# Patient Record
Sex: MALE | Race: WHITE | NOT HISPANIC OR LATINO | Employment: UNEMPLOYED
[De-identification: names, ages, dates, MRNs, and addresses within clinical notes are randomized per-mention and may not be internally consistent; named-entity substitution may affect disease eponyms.]

---

## 2019-04-02 ENCOUNTER — HOSPITAL (OUTPATIENT)
Dept: OTHER | Age: 54
End: 2019-04-02

## 2020-12-02 ENCOUNTER — APPOINTMENT (OUTPATIENT)
Dept: GENERAL RADIOLOGY | Age: 55
DRG: 282 | End: 2020-12-02
Attending: EMERGENCY MEDICINE

## 2020-12-02 ENCOUNTER — HOSPITAL ENCOUNTER (INPATIENT)
Age: 55
LOS: 5 days | Discharge: HOME | DRG: 282 | End: 2020-12-08
Attending: EMERGENCY MEDICINE | Admitting: INTERNAL MEDICINE

## 2020-12-02 ENCOUNTER — APPOINTMENT (OUTPATIENT)
Dept: CT IMAGING | Age: 55
DRG: 282 | End: 2020-12-02
Attending: EMERGENCY MEDICINE

## 2020-12-02 ENCOUNTER — APPOINTMENT (OUTPATIENT)
Dept: ULTRASOUND IMAGING | Age: 55
DRG: 282 | End: 2020-12-02
Attending: INTERNAL MEDICINE

## 2020-12-02 DIAGNOSIS — K85.20 ALCOHOL-INDUCED ACUTE PANCREATITIS WITHOUT INFECTION OR NECROSIS: ICD-10-CM

## 2020-12-02 DIAGNOSIS — E87.1 HYPONATREMIA: ICD-10-CM

## 2020-12-02 DIAGNOSIS — N30.01 ACUTE CYSTITIS WITH HEMATURIA: Primary | ICD-10-CM

## 2020-12-02 DIAGNOSIS — R79.89 ELEVATED LFTS: ICD-10-CM

## 2020-12-02 DIAGNOSIS — E87.6 HYPOKALEMIA: ICD-10-CM

## 2020-12-02 DIAGNOSIS — K85.90 ACUTE PANCREATITIS, UNSPECIFIED COMPLICATION STATUS, UNSPECIFIED PANCREATITIS TYPE: ICD-10-CM

## 2020-12-02 DIAGNOSIS — F10.10 ETOH ABUSE: ICD-10-CM

## 2020-12-02 LAB
ALBUMIN SERPL-MCNC: 3.1 G/DL (ref 3.6–5.1)
ALBUMIN/GLOB SERPL: 0.8 {RATIO} (ref 1–2.4)
ALP SERPL-CCNC: 146 UNITS/L (ref 45–117)
ALT SERPL-CCNC: 40 UNITS/L
ANION GAP SERPL CALC-SCNC: 13 MMOL/L (ref 10–20)
APPEARANCE UR: CLEAR
AST SERPL-CCNC: 59 UNITS/L
ATRIAL RATE (BPM): 93
BASOPHILS # BLD: 0 K/MCL (ref 0–0.3)
BASOPHILS NFR BLD: 0 %
BILIRUB SERPL-MCNC: 4.4 MG/DL (ref 0.2–1)
BILIRUB UR QL STRIP: ABNORMAL
BUN SERPL-MCNC: 9 MG/DL (ref 6–20)
BUN/CREAT SERPL: 20 (ref 7–25)
CALCIUM SERPL-MCNC: 8.5 MG/DL (ref 8.4–10.2)
CHLORIDE SERPL-SCNC: 92 MMOL/L (ref 98–107)
CO2 SERPL-SCNC: 28 MMOL/L (ref 21–32)
COLOR UR: ABNORMAL
CREAT SERPL-MCNC: 0.45 MG/DL (ref 0.67–1.17)
DIFFERENTIAL METHOD BLD: ABNORMAL
EOSINOPHIL # BLD: 0 K/MCL (ref 0.1–0.5)
EOSINOPHIL NFR BLD: 0 %
ERYTHROCYTE [DISTWIDTH] IN BLOOD: 12.6 % (ref 11–15)
GLOBULIN SER-MCNC: 3.8 G/DL (ref 2–4)
GLUCOSE SERPL-MCNC: 180 MG/DL (ref 65–99)
GLUCOSE UR STRIP-MCNC: 500 MG/DL
HCT VFR BLD CALC: 43.8 % (ref 39–51)
HGB BLD-MCNC: 15.3 G/DL (ref 13–17)
HGB UR QL STRIP: ABNORMAL
IMM GRANULOCYTES # BLD AUTO: 0.1 K/MCL (ref 0–0.2)
IMM GRANULOCYTES NFR BLD: 1 %
KETONES UR STRIP-MCNC: >160 MG/DL
LEUKOCYTE ESTERASE UR QL STRIP: NEGATIVE
LIPASE SERPL-CCNC: 212 UNITS/L (ref 73–393)
LYMPHOCYTES # BLD: 0.8 K/MCL (ref 1–4)
LYMPHOCYTES NFR BLD: 6 %
MCH RBC QN AUTO: 30.8 PG (ref 26–34)
MCHC RBC AUTO-ENTMCNC: 34.9 G/DL (ref 32–36.5)
MCV RBC AUTO: 88.1 FL (ref 78–100)
MONOCYTES # BLD: 0.6 K/MCL (ref 0.3–0.9)
MONOCYTES NFR BLD: 5 %
NEUTROPHILS # BLD: 11.2 K/MCL (ref 1.8–7.7)
NEUTROPHILS NFR BLD: 88 %
NITRITE UR QL STRIP: POSITIVE
NRBC BLD MANUAL-RTO: 0 /100 WBC
P AXIS (DEGREES): 59
PH UR STRIP: 6 UNITS (ref 5–7)
PLATELET # BLD: 82 K/MCL (ref 140–450)
POTASSIUM SERPL-SCNC: 2.4 MMOL/L (ref 3.4–5.1)
PR-INTERVAL (MSEC): 124
PROT SERPL-MCNC: 6.9 G/DL (ref 6.4–8.2)
PROT UR STRIP-MCNC: >300 MG/DL
QRS-INTERVAL (MSEC): 82
QT-INTERVAL (MSEC): 386
QTC: 480
R AXIS (DEGREES): 38
RBC # BLD: 4.97 MIL/MCL (ref 4.5–5.9)
REPORT TEXT: NORMAL
SARS-COV-2 RNA RESP QL NAA+PROBE: NOT DETECTED
SERVICE CMNT-IMP: NORMAL
SODIUM SERPL-SCNC: 131 MMOL/L (ref 135–145)
SP GR UR STRIP: 1.02 (ref 1–1.03)
SPECIMEN SOURCE: NORMAL
T AXIS (DEGREES): 44
TROPONIN I SERPL HS-MCNC: <0.02 NG/ML
UROBILINOGEN UR STRIP-MCNC: >8 MG/DL (ref 0–1)
VENTRICULAR RATE EKG/MIN (BPM): 93
WBC # BLD: 12.7 K/MCL (ref 4.2–11)

## 2020-12-02 PROCEDURE — G0378 HOSPITAL OBSERVATION PER HR: HCPCS

## 2020-12-02 PROCEDURE — 10002805 HB CONTRAST AGENT: Performed by: EMERGENCY MEDICINE

## 2020-12-02 PROCEDURE — 99220 INITIAL OBSERVATION CARE,LEVL III: CPT | Performed by: INTERNAL MEDICINE

## 2020-12-02 PROCEDURE — 76705 ECHO EXAM OF ABDOMEN: CPT

## 2020-12-02 PROCEDURE — 81003 URINALYSIS AUTO W/O SCOPE: CPT

## 2020-12-02 PROCEDURE — 74177 CT ABD & PELVIS W/CONTRAST: CPT

## 2020-12-02 PROCEDURE — 96365 THER/PROPH/DIAG IV INF INIT: CPT

## 2020-12-02 PROCEDURE — 93010 ELECTROCARDIOGRAM REPORT: CPT | Performed by: INTERNAL MEDICINE

## 2020-12-02 PROCEDURE — 71046 X-RAY EXAM CHEST 2 VIEWS: CPT

## 2020-12-02 PROCEDURE — 85025 COMPLETE CBC W/AUTO DIFF WBC: CPT

## 2020-12-02 PROCEDURE — 10002800 HB RX 250 W HCPCS: Performed by: EMERGENCY MEDICINE

## 2020-12-02 PROCEDURE — 10002801 HB RX 250 W/O HCPCS: Performed by: EMERGENCY MEDICINE

## 2020-12-02 PROCEDURE — 96376 TX/PRO/DX INJ SAME DRUG ADON: CPT

## 2020-12-02 PROCEDURE — 10002800 HB RX 250 W HCPCS: Performed by: INTERNAL MEDICINE

## 2020-12-02 PROCEDURE — U0003 INFECTIOUS AGENT DETECTION BY NUCLEIC ACID (DNA OR RNA); SEVERE ACUTE RESPIRATORY SYNDROME CORONAVIRUS 2 (SARS-COV-2) (CORONAVIRUS DISEASE [COVID-19]), AMPLIFIED PROBE TECHNIQUE, MAKING USE OF HIGH THROUGHPUT TECHNOLOGIES AS DESCRIBED BY CMS-2020-01-R: HCPCS

## 2020-12-02 PROCEDURE — 96366 THER/PROPH/DIAG IV INF ADDON: CPT

## 2020-12-02 PROCEDURE — 99285 EMERGENCY DEPT VISIT HI MDM: CPT | Performed by: EMERGENCY MEDICINE

## 2020-12-02 PROCEDURE — 96375 TX/PRO/DX INJ NEW DRUG ADDON: CPT

## 2020-12-02 PROCEDURE — 10002807 HB RX 258: Performed by: INTERNAL MEDICINE

## 2020-12-02 PROCEDURE — 83690 ASSAY OF LIPASE: CPT

## 2020-12-02 PROCEDURE — 10002807 HB RX 258: Performed by: EMERGENCY MEDICINE

## 2020-12-02 PROCEDURE — 10002801 HB RX 250 W/O HCPCS: Performed by: INTERNAL MEDICINE

## 2020-12-02 PROCEDURE — 84484 ASSAY OF TROPONIN QUANT: CPT

## 2020-12-02 PROCEDURE — 96361 HYDRATE IV INFUSION ADD-ON: CPT

## 2020-12-02 PROCEDURE — 93005 ELECTROCARDIOGRAM TRACING: CPT | Performed by: EMERGENCY MEDICINE

## 2020-12-02 PROCEDURE — 10002803 HB RX 637: Performed by: EMERGENCY MEDICINE

## 2020-12-02 PROCEDURE — 99285 EMERGENCY DEPT VISIT HI MDM: CPT

## 2020-12-02 PROCEDURE — 10004651 HB RX, NO CHARGE ITEM: Performed by: INTERNAL MEDICINE

## 2020-12-02 PROCEDURE — 80053 COMPREHEN METABOLIC PANEL: CPT

## 2020-12-02 RX ORDER — AMOXICILLIN 250 MG
2 CAPSULE ORAL DAILY PRN
Status: DISCONTINUED | OUTPATIENT
Start: 2020-12-02 | End: 2020-12-08 | Stop reason: HOSPADM

## 2020-12-02 RX ORDER — 0.9 % SODIUM CHLORIDE 0.9 %
2 VIAL (ML) INJECTION EVERY 12 HOURS SCHEDULED
Status: DISCONTINUED | OUTPATIENT
Start: 2020-12-02 | End: 2020-12-08 | Stop reason: HOSPADM

## 2020-12-02 RX ORDER — ACETAMINOPHEN 325 MG/1
650 TABLET ORAL EVERY 4 HOURS PRN
Status: DISCONTINUED | OUTPATIENT
Start: 2020-12-02 | End: 2020-12-08 | Stop reason: HOSPADM

## 2020-12-02 RX ORDER — POLYETHYLENE GLYCOL 3350 17 G/17G
17 POWDER, FOR SOLUTION ORAL DAILY PRN
Status: DISCONTINUED | OUTPATIENT
Start: 2020-12-02 | End: 2020-12-08 | Stop reason: HOSPADM

## 2020-12-02 RX ORDER — POTASSIUM CHLORIDE 14.9 MG/ML
20 INJECTION INTRAVENOUS ONCE
Status: COMPLETED | OUTPATIENT
Start: 2020-12-02 | End: 2020-12-02

## 2020-12-02 RX ORDER — FAMOTIDINE 10 MG/ML
20 INJECTION, SOLUTION INTRAVENOUS ONCE
Status: COMPLETED | OUTPATIENT
Start: 2020-12-02 | End: 2020-12-02

## 2020-12-02 RX ORDER — BISACODYL 10 MG
10 SUPPOSITORY, RECTAL RECTAL DAILY PRN
Status: DISCONTINUED | OUTPATIENT
Start: 2020-12-02 | End: 2020-12-08 | Stop reason: HOSPADM

## 2020-12-02 RX ORDER — ONDANSETRON 2 MG/ML
4 INJECTION INTRAMUSCULAR; INTRAVENOUS 2 TIMES DAILY PRN
Status: DISCONTINUED | OUTPATIENT
Start: 2020-12-02 | End: 2020-12-08 | Stop reason: HOSPADM

## 2020-12-02 RX ORDER — MAGNESIUM HYDROXIDE/ALUMINUM HYDROXICE/SIMETHICONE 120; 1200; 1200 MG/30ML; MG/30ML; MG/30ML
30 SUSPENSION ORAL EVERY 4 HOURS PRN
Status: DISCONTINUED | OUTPATIENT
Start: 2020-12-02 | End: 2020-12-08 | Stop reason: HOSPADM

## 2020-12-02 RX ORDER — POTASSIUM CHLORIDE 20 MEQ/1
40 TABLET, EXTENDED RELEASE ORAL ONCE
Status: COMPLETED | OUTPATIENT
Start: 2020-12-02 | End: 2020-12-02

## 2020-12-02 RX ORDER — CEFAZOLIN SODIUM/WATER 2 G/20 ML
2000 SYRINGE (ML) INTRAVENOUS DAILY
Status: DISCONTINUED | OUTPATIENT
Start: 2020-12-03 | End: 2020-12-06

## 2020-12-02 RX ORDER — ONDANSETRON 2 MG/ML
4 INJECTION INTRAMUSCULAR; INTRAVENOUS ONCE
Status: COMPLETED | OUTPATIENT
Start: 2020-12-02 | End: 2020-12-02

## 2020-12-02 RX ORDER — HEPARIN SODIUM 5000 [USP'U]/ML
5000 INJECTION, SOLUTION INTRAVENOUS; SUBCUTANEOUS EVERY 8 HOURS SCHEDULED
Status: DISCONTINUED | OUTPATIENT
Start: 2020-12-02 | End: 2020-12-08 | Stop reason: HOSPADM

## 2020-12-02 RX ORDER — CEFAZOLIN SODIUM/WATER 2 G/20 ML
2000 SYRINGE (ML) INTRAVENOUS ONCE
Status: COMPLETED | OUTPATIENT
Start: 2020-12-02 | End: 2020-12-02

## 2020-12-02 RX ORDER — HYDROCODONE BITARTRATE AND ACETAMINOPHEN 5; 325 MG/1; MG/1
1 TABLET ORAL EVERY 4 HOURS PRN
Status: DISCONTINUED | OUTPATIENT
Start: 2020-12-02 | End: 2020-12-08 | Stop reason: HOSPADM

## 2020-12-02 RX ORDER — FAMOTIDINE 10 MG/ML
20 INJECTION, SOLUTION INTRAVENOUS EVERY 12 HOURS SCHEDULED
Status: DISCONTINUED | OUTPATIENT
Start: 2020-12-02 | End: 2020-12-04

## 2020-12-02 RX ORDER — SODIUM CHLORIDE 9 MG/ML
INJECTION, SOLUTION INTRAVENOUS CONTINUOUS
Status: DISCONTINUED | OUTPATIENT
Start: 2020-12-02 | End: 2020-12-03

## 2020-12-02 RX ADMIN — MORPHINE SULFATE 2 MG: 2 INJECTION, SOLUTION INTRAMUSCULAR; INTRAVENOUS at 17:59

## 2020-12-02 RX ADMIN — CEFTRIAXONE SODIUM 2000 MG: 100 INJECTION, POWDER, FOR SOLUTION INTRAVENOUS at 14:36

## 2020-12-02 RX ADMIN — POTASSIUM CHLORIDE 40 MEQ: 1500 TABLET, EXTENDED RELEASE ORAL at 13:43

## 2020-12-02 RX ADMIN — SODIUM CHLORIDE, PRESERVATIVE FREE 2 ML: 5 INJECTION INTRAVENOUS at 20:56

## 2020-12-02 RX ADMIN — SODIUM CHLORIDE: 0.9 INJECTION, SOLUTION INTRAVENOUS at 18:02

## 2020-12-02 RX ADMIN — IOHEXOL 100 ML: 300 INJECTION, SOLUTION INTRAVENOUS at 14:08

## 2020-12-02 RX ADMIN — MORPHINE SULFATE 2 MG: 2 INJECTION, SOLUTION INTRAMUSCULAR; INTRAVENOUS at 21:49

## 2020-12-02 RX ADMIN — FAMOTIDINE 20 MG: 10 INJECTION INTRAVENOUS at 20:50

## 2020-12-02 RX ADMIN — SODIUM CHLORIDE 1000 ML: 0.9 INJECTION, SOLUTION INTRAVENOUS at 13:39

## 2020-12-02 RX ADMIN — ONDANSETRON 4 MG: 2 INJECTION INTRAMUSCULAR; INTRAVENOUS at 13:41

## 2020-12-02 RX ADMIN — MORPHINE SULFATE 4 MG: 4 INJECTION INTRAVENOUS at 13:40

## 2020-12-02 RX ADMIN — FAMOTIDINE 20 MG: 10 INJECTION INTRAVENOUS at 13:41

## 2020-12-02 RX ADMIN — POTASSIUM CHLORIDE 20 MEQ: 14.9 INJECTION, SOLUTION INTRAVENOUS at 14:38

## 2020-12-02 RX ADMIN — SODIUM CHLORIDE 1000 ML: 0.9 INJECTION, SOLUTION INTRAVENOUS at 14:37

## 2020-12-02 ASSESSMENT — ENCOUNTER SYMPTOMS
NAUSEA: 1
NUMBNESS: 0
ABDOMINAL PAIN: 1
WEAKNESS: 0
BACK PAIN: 0
POLYDIPSIA: 0
FEVER: 0
SPEECH DIFFICULTY: 0
SHORTNESS OF BREATH: 0
CONSTIPATION: 0
CHILLS: 0
DIZZINESS: 0
DIARRHEA: 0
VOMITING: 1
EYE REDNESS: 0
RHINORRHEA: 0
TREMORS: 0
BRUISES/BLEEDS EASILY: 0
EYE PAIN: 0
HEADACHES: 0
DIAPHORESIS: 0
COUGH: 0
FATIGUE: 0
SORE THROAT: 0

## 2020-12-02 ASSESSMENT — PAIN SCALES - GENERAL
PAINLEVEL_OUTOF10: 10
PAINLEVEL_OUTOF10: 3
PAINLEVEL_OUTOF10: 5
PAINLEVEL_OUTOF10: 0

## 2020-12-02 ASSESSMENT — PAIN DESCRIPTION - PAIN TYPE: TYPE: ACUTE PAIN

## 2020-12-03 PROBLEM — N30.01 ACUTE CYSTITIS WITH HEMATURIA: Status: ACTIVE | Noted: 2020-12-03

## 2020-12-03 PROBLEM — K85.90 ACUTE PANCREATITIS: Status: ACTIVE | Noted: 2020-12-03

## 2020-12-03 PROBLEM — D72.828 OTHER ELEVATED WHITE BLOOD CELL COUNT: Status: ACTIVE | Noted: 2020-12-03

## 2020-12-03 PROBLEM — E87.6 HYPOKALEMIA: Status: ACTIVE | Noted: 2020-12-03

## 2020-12-03 PROBLEM — E87.1 HYPONATREMIA: Status: ACTIVE | Noted: 2020-12-03

## 2020-12-03 LAB
ALBUMIN SERPL-MCNC: 2.7 G/DL (ref 3.6–5.1)
ALBUMIN/GLOB SERPL: 0.7 {RATIO} (ref 1–2.4)
ALP SERPL-CCNC: 125 UNITS/L (ref 45–117)
ALT SERPL-CCNC: 30 UNITS/L
ANION GAP SERPL CALC-SCNC: 17 MMOL/L (ref 10–20)
AST SERPL-CCNC: 39 UNITS/L
BASOPHILS # BLD: 0 K/MCL (ref 0–0.3)
BASOPHILS NFR BLD: 0 %
BILIRUB SERPL-MCNC: 2.9 MG/DL (ref 0.2–1)
BUN SERPL-MCNC: 6 MG/DL (ref 6–20)
BUN/CREAT SERPL: 14 (ref 7–25)
CALCIUM SERPL-MCNC: 8 MG/DL (ref 8.4–10.2)
CHLORIDE SERPL-SCNC: 93 MMOL/L (ref 98–107)
CO2 SERPL-SCNC: 25 MMOL/L (ref 21–32)
CREAT SERPL-MCNC: 0.43 MG/DL (ref 0.67–1.17)
DIFFERENTIAL METHOD BLD: ABNORMAL
EOSINOPHIL # BLD: 0 K/MCL (ref 0.1–0.5)
EOSINOPHIL NFR BLD: 0 %
ERYTHROCYTE [DISTWIDTH] IN BLOOD: 12.5 % (ref 11–15)
GLOBULIN SER-MCNC: 3.7 G/DL (ref 2–4)
GLUCOSE SERPL-MCNC: 117 MG/DL (ref 65–99)
HCT VFR BLD CALC: 41.7 % (ref 39–51)
HGB BLD-MCNC: 14.2 G/DL (ref 13–17)
IMM GRANULOCYTES # BLD AUTO: 0.1 K/MCL (ref 0–0.2)
IMM GRANULOCYTES NFR BLD: 1 %
LYMPHOCYTES # BLD: 0.9 K/MCL (ref 1–4)
LYMPHOCYTES NFR BLD: 9 %
MCH RBC QN AUTO: 31.3 PG (ref 26–34)
MCHC RBC AUTO-ENTMCNC: 34.1 G/DL (ref 32–36.5)
MCV RBC AUTO: 91.9 FL (ref 78–100)
MONOCYTES # BLD: 0.6 K/MCL (ref 0.3–0.9)
MONOCYTES NFR BLD: 6 %
NEUTROPHILS # BLD: 8.2 K/MCL (ref 1.8–7.7)
NEUTROPHILS NFR BLD: 84 %
NRBC BLD MANUAL-RTO: 0 /100 WBC
PLATELET # BLD: 81 K/MCL (ref 140–450)
POTASSIUM SERPL-SCNC: 2.7 MMOL/L (ref 3.4–5.1)
POTASSIUM SERPL-SCNC: 3 MMOL/L (ref 3.4–5.1)
PROT SERPL-MCNC: 6.4 G/DL (ref 6.4–8.2)
RBC # BLD: 4.54 MIL/MCL (ref 4.5–5.9)
SODIUM SERPL-SCNC: 132 MMOL/L (ref 135–145)
WBC # BLD: 9.7 K/MCL (ref 4.2–11)

## 2020-12-03 PROCEDURE — 10000002 HB ROOM CHARGE MED SURG

## 2020-12-03 PROCEDURE — G0378 HOSPITAL OBSERVATION PER HR: HCPCS

## 2020-12-03 PROCEDURE — 10004651 HB RX, NO CHARGE ITEM: Performed by: INTERNAL MEDICINE

## 2020-12-03 PROCEDURE — 90686 IIV4 VACC NO PRSV 0.5 ML IM: CPT | Performed by: INTERNAL MEDICINE

## 2020-12-03 PROCEDURE — 10002803 HB RX 637: Performed by: INTERNAL MEDICINE

## 2020-12-03 PROCEDURE — 10002807 HB RX 258: Performed by: NURSE PRACTITIONER

## 2020-12-03 PROCEDURE — 99254 IP/OBS CNSLTJ NEW/EST MOD 60: CPT | Performed by: NURSE PRACTITIONER

## 2020-12-03 PROCEDURE — 80053 COMPREHEN METABOLIC PANEL: CPT

## 2020-12-03 PROCEDURE — 10002800 HB RX 250 W HCPCS: Performed by: INTERNAL MEDICINE

## 2020-12-03 PROCEDURE — 96376 TX/PRO/DX INJ SAME DRUG ADON: CPT

## 2020-12-03 PROCEDURE — 99233 SBSQ HOSP IP/OBS HIGH 50: CPT | Performed by: INTERNAL MEDICINE

## 2020-12-03 PROCEDURE — 10002801 HB RX 250 W/O HCPCS: Performed by: INTERNAL MEDICINE

## 2020-12-03 PROCEDURE — 10002807 HB RX 258: Performed by: INTERNAL MEDICINE

## 2020-12-03 PROCEDURE — 36415 COLL VENOUS BLD VENIPUNCTURE: CPT

## 2020-12-03 PROCEDURE — 84132 ASSAY OF SERUM POTASSIUM: CPT

## 2020-12-03 PROCEDURE — 85025 COMPLETE CBC W/AUTO DIFF WBC: CPT

## 2020-12-03 RX ORDER — POTASSIUM CHLORIDE 20 MEQ/1
40 TABLET, EXTENDED RELEASE ORAL ONCE
Status: COMPLETED | OUTPATIENT
Start: 2020-12-03 | End: 2020-12-03

## 2020-12-03 RX ORDER — SODIUM CHLORIDE, SODIUM LACTATE, POTASSIUM CHLORIDE, CALCIUM CHLORIDE 600; 310; 30; 20 MG/100ML; MG/100ML; MG/100ML; MG/100ML
INJECTION, SOLUTION INTRAVENOUS CONTINUOUS
Status: DISCONTINUED | OUTPATIENT
Start: 2020-12-03 | End: 2020-12-08 | Stop reason: HOSPADM

## 2020-12-03 RX ORDER — ESCITALOPRAM OXALATE 20 MG/1
20 TABLET ORAL DAILY
COMMUNITY
Start: 2020-12-03

## 2020-12-03 RX ORDER — POTASSIUM CHLORIDE 20 MEQ/1
40 TABLET, EXTENDED RELEASE ORAL
Status: DISCONTINUED | OUTPATIENT
Start: 2020-12-03 | End: 2020-12-08 | Stop reason: HOSPADM

## 2020-12-03 RX ADMIN — POTASSIUM CHLORIDE 40 MEQ: 1500 TABLET, EXTENDED RELEASE ORAL at 13:53

## 2020-12-03 RX ADMIN — FAMOTIDINE 20 MG: 10 INJECTION INTRAVENOUS at 10:00

## 2020-12-03 RX ADMIN — HEPARIN SODIUM 5000 UNITS: 5000 INJECTION INTRAVENOUS; SUBCUTANEOUS at 13:41

## 2020-12-03 RX ADMIN — MORPHINE SULFATE 2 MG: 2 INJECTION, SOLUTION INTRAMUSCULAR; INTRAVENOUS at 14:31

## 2020-12-03 RX ADMIN — MORPHINE SULFATE 2 MG: 2 INJECTION, SOLUTION INTRAMUSCULAR; INTRAVENOUS at 18:39

## 2020-12-03 RX ADMIN — SODIUM CHLORIDE: 0.9 INJECTION, SOLUTION INTRAVENOUS at 06:01

## 2020-12-03 RX ADMIN — FAMOTIDINE 20 MG: 10 INJECTION INTRAVENOUS at 21:32

## 2020-12-03 RX ADMIN — HEPARIN SODIUM 5000 UNITS: 5000 INJECTION INTRAVENOUS; SUBCUTANEOUS at 21:33

## 2020-12-03 RX ADMIN — POTASSIUM CHLORIDE 40 MEQ: 1500 TABLET, EXTENDED RELEASE ORAL at 18:36

## 2020-12-03 RX ADMIN — CEFTRIAXONE SODIUM 2000 MG: 100 INJECTION, POWDER, FOR SOLUTION INTRAVENOUS at 10:27

## 2020-12-03 RX ADMIN — SODIUM CHLORIDE, PRESERVATIVE FREE 2 ML: 5 INJECTION INTRAVENOUS at 22:55

## 2020-12-03 RX ADMIN — POTASSIUM CHLORIDE 40 MEQ: 1500 TABLET, EXTENDED RELEASE ORAL at 10:26

## 2020-12-03 RX ADMIN — MORPHINE SULFATE 2 MG: 2 INJECTION, SOLUTION INTRAMUSCULAR; INTRAVENOUS at 02:23

## 2020-12-03 RX ADMIN — INFLUENZA A VIRUS A/GUANGDONG-MAONAN/SWL1536/2019 CNIC-1909 (H1N1) ANTIGEN (FORMALDEHYDE INACTIVATED), INFLUENZA A VIRUS A/HONG KONG/2671/2019 (H3N2) ANTIGEN (FORMALDEHYDE INACTIVATED), INFLUENZA B VIRUS B/PHUKET/3073/2013 ANTIGEN (FORMALDEHYDE INACTIVATED), AND INFLUENZA B VIRUS B/WASHINGTON/02/2019 ANTIGEN (FORMALDEHYDE INACTIVATED) 0.5 ML: 15; 15; 15; 15 INJECTION, SUSPENSION INTRAMUSCULAR at 13:42

## 2020-12-03 RX ADMIN — MORPHINE SULFATE 2 MG: 2 INJECTION, SOLUTION INTRAMUSCULAR; INTRAVENOUS at 05:59

## 2020-12-03 RX ADMIN — MORPHINE SULFATE 2 MG: 2 INJECTION, SOLUTION INTRAMUSCULAR; INTRAVENOUS at 10:26

## 2020-12-03 RX ADMIN — SODIUM CHLORIDE, SODIUM LACTATE, POTASSIUM CHLORIDE, AND CALCIUM CHLORIDE: .6; .31; .03; .02 INJECTION, SOLUTION INTRAVENOUS at 14:02

## 2020-12-03 RX ADMIN — SODIUM CHLORIDE, SODIUM LACTATE, POTASSIUM CHLORIDE, AND CALCIUM CHLORIDE: .6; .31; .03; .02 INJECTION, SOLUTION INTRAVENOUS at 18:44

## 2020-12-03 RX ADMIN — MORPHINE SULFATE 2 MG: 2 INJECTION, SOLUTION INTRAMUSCULAR; INTRAVENOUS at 22:43

## 2020-12-03 RX ADMIN — SODIUM CHLORIDE, PRESERVATIVE FREE 2 ML: 5 INJECTION INTRAVENOUS at 10:27

## 2020-12-03 ASSESSMENT — LIFESTYLE VARIABLES
AUDIT-C TOTAL SCORE: 1
HOW MANY STANDARD DRINKS CONTAINING ALCOHOL DO YOU HAVE ON A TYPICAL DAY: 0,1 OR 2
HOW OFTEN DO YOU HAVE A DRINK CONTAINING ALCOHOL: MONTHLY OR LESS
ALCOHOL_USE_STATUS: NO OR LOW RISK WITH VALIDATED TOOL
HOW OFTEN DO YOU HAVE 6 OR MORE DRINKS ON ONE OCCASION: NEVER

## 2020-12-03 ASSESSMENT — PAIN SCALES - GENERAL
PAINLEVEL_OUTOF10: 2
PAINLEVEL_OUTOF10: 4
PAINLEVEL_OUTOF10: 9
PAINLEVEL_OUTOF10: 8
PAINLEVEL_OUTOF10: 4

## 2020-12-03 ASSESSMENT — PAIN SCALES - WONG BAKER: WONGBAKER_NUMERICALRESPONSE: 4

## 2020-12-03 ASSESSMENT — COLUMBIA-SUICIDE SEVERITY RATING SCALE - C-SSRS
IS THE PATIENT ABLE TO COMPLETE C-SSRS: YES
2. HAVE YOU ACTUALLY HAD ANY THOUGHTS OF KILLING YOURSELF?: NO
6. HAVE YOU EVER DONE ANYTHING, STARTED TO DO ANYTHING, OR PREPARED TO DO ANYTHING TO END YOUR LIFE?: NO
1. WITHIN THE PAST MONTH, HAVE YOU WISHED YOU WERE DEAD OR WISHED YOU COULD GO TO SLEEP AND NOT WAKE UP?: NO

## 2020-12-03 ASSESSMENT — PATIENT HEALTH QUESTIONNAIRE - PHQ9
SUM OF ALL RESPONSES TO PHQ9 QUESTIONS 1 AND 2: 0
1. LITTLE INTEREST OR PLEASURE IN DOING THINGS: NOT AT ALL
SUM OF ALL RESPONSES TO PHQ9 QUESTIONS 1 AND 2: 0
IS PATIENT ABLE TO COMPLETE PHQ2 OR PHQ9: YES
CLINICAL INTERPRETATION OF PHQ9 SCORE: NO FURTHER SCREENING NEEDED
CLINICAL INTERPRETATION OF PHQ2 SCORE: NO FURTHER SCREENING NEEDED
2. FEELING DOWN, DEPRESSED OR HOPELESS: NOT AT ALL

## 2020-12-03 ASSESSMENT — COGNITIVE AND FUNCTIONAL STATUS - GENERAL
DO YOU HAVE SERIOUS DIFFICULTY WALKING OR CLIMBING STAIRS: NO
BECAUSE OF A PHYSICAL, MENTAL, OR EMOTIONAL CONDITION, DO YOU HAVE SERIOUS DIFFICULTY CONCENTRATING, REMEMBERING OR MAKING DECISIONS: NO
ARE YOU BLIND OR DO YOU HAVE SERIOUS DIFFICULTY SEEING, EVEN WHEN WEARING GLASSES: NO
DO YOU HAVE DIFFICULTY DRESSING OR BATHING: NO
BECAUSE OF A PHYSICAL, MENTAL, OR EMOTIONAL CONDITION, DO YOU HAVE DIFFICULTY DOING ERRANDS ALONE: NO
ARE YOU DEAF OR DO YOU HAVE SERIOUS DIFFICULTY  HEARING: NO

## 2020-12-03 ASSESSMENT — ACTIVITIES OF DAILY LIVING (ADL)
RECENT_DECLINE_ADL: NO
ADL_BEFORE_ADMISSION: INDEPENDENT
ADL_SHORT_OF_BREATH: NO
ADL_SCORE: 12

## 2020-12-04 ENCOUNTER — APPOINTMENT (OUTPATIENT)
Dept: GENERAL RADIOLOGY | Age: 55
DRG: 282 | End: 2020-12-04
Attending: INTERNAL MEDICINE

## 2020-12-04 LAB
ALBUMIN SERPL-MCNC: 2.7 G/DL (ref 3.6–5.1)
ALP SERPL-CCNC: 142 UNITS/L (ref 45–117)
ALT SERPL-CCNC: 30 UNITS/L
ANION GAP SERPL CALC-SCNC: 11 MMOL/L (ref 10–20)
AST SERPL-CCNC: 50 UNITS/L
BASOPHILS # BLD: 0 K/MCL (ref 0–0.3)
BASOPHILS NFR BLD: 1 %
BILIRUB CONJ SERPL-MCNC: 0.6 MG/DL (ref 0–0.2)
BILIRUB SERPL-MCNC: 1.3 MG/DL (ref 0.2–1)
BUN SERPL-MCNC: 4 MG/DL (ref 6–20)
BUN/CREAT SERPL: 11 (ref 7–25)
CALCIUM SERPL-MCNC: 8.4 MG/DL (ref 8.4–10.2)
CHLORIDE SERPL-SCNC: 96 MMOL/L (ref 98–107)
CO2 SERPL-SCNC: 31 MMOL/L (ref 21–32)
CREAT SERPL-MCNC: 0.38 MG/DL (ref 0.67–1.17)
DIFFERENTIAL METHOD BLD: ABNORMAL
EOSINOPHIL # BLD: 0.1 K/MCL (ref 0.1–0.5)
EOSINOPHIL NFR BLD: 2 %
ERYTHROCYTE [DISTWIDTH] IN BLOOD: 12.7 % (ref 11–15)
GLUCOSE SERPL-MCNC: 145 MG/DL (ref 65–99)
HCT VFR BLD CALC: 40.9 % (ref 39–51)
HGB BLD-MCNC: 14.1 G/DL (ref 13–17)
IMM GRANULOCYTES # BLD AUTO: 0 K/MCL (ref 0–0.2)
IMM GRANULOCYTES NFR BLD: 0 %
INR PPP: 1.3
INR PPP: 1.4
LYMPHOCYTES # BLD: 0.7 K/MCL (ref 1–4)
LYMPHOCYTES NFR BLD: 11 %
MAGNESIUM SERPL-MCNC: 1.6 MG/DL (ref 1.7–2.4)
MCH RBC QN AUTO: 31.3 PG (ref 26–34)
MCHC RBC AUTO-ENTMCNC: 34.5 G/DL (ref 32–36.5)
MCV RBC AUTO: 90.9 FL (ref 78–100)
MONOCYTES # BLD: 0.6 K/MCL (ref 0.3–0.9)
MONOCYTES NFR BLD: 11 %
NEUTROPHILS # BLD: 4.5 K/MCL (ref 1.8–7.7)
NEUTROPHILS NFR BLD: 75 %
NRBC BLD MANUAL-RTO: 0 /100 WBC
PLATELET # BLD: 93 K/MCL (ref 140–450)
POTASSIUM SERPL-SCNC: 2.7 MMOL/L (ref 3.4–5.1)
POTASSIUM SERPL-SCNC: 2.8 MMOL/L (ref 3.4–5.1)
POTASSIUM SERPL-SCNC: 2.9 MMOL/L (ref 3.4–5.1)
PROT SERPL-MCNC: 6.8 G/DL (ref 6.4–8.2)
PROTHROMBIN TIME: 14.4 SEC (ref 9.7–11.8)
PROTHROMBIN TIME: 14.6 SEC (ref 9.7–11.8)
RBC # BLD: 4.5 MIL/MCL (ref 4.5–5.9)
SODIUM SERPL-SCNC: 135 MMOL/L (ref 135–145)
WBC # BLD: 6 K/MCL (ref 4.2–11)

## 2020-12-04 PROCEDURE — 71046 X-RAY EXAM CHEST 2 VIEWS: CPT

## 2020-12-04 PROCEDURE — 85610 PROTHROMBIN TIME: CPT

## 2020-12-04 PROCEDURE — 10002801 HB RX 250 W/O HCPCS: Performed by: INTERNAL MEDICINE

## 2020-12-04 PROCEDURE — 80048 BASIC METABOLIC PNL TOTAL CA: CPT

## 2020-12-04 PROCEDURE — 84132 ASSAY OF SERUM POTASSIUM: CPT

## 2020-12-04 PROCEDURE — 10002803 HB RX 637: Performed by: INTERNAL MEDICINE

## 2020-12-04 PROCEDURE — 99233 SBSQ HOSP IP/OBS HIGH 50: CPT | Performed by: INTERNAL MEDICINE

## 2020-12-04 PROCEDURE — 10000002 HB ROOM CHARGE MED SURG

## 2020-12-04 PROCEDURE — 10002807 HB RX 258: Performed by: NURSE PRACTITIONER

## 2020-12-04 PROCEDURE — 10004651 HB RX, NO CHARGE ITEM: Performed by: INTERNAL MEDICINE

## 2020-12-04 PROCEDURE — 99254 IP/OBS CNSLTJ NEW/EST MOD 60: CPT | Performed by: NURSE PRACTITIONER

## 2020-12-04 PROCEDURE — 85025 COMPLETE CBC W/AUTO DIFF WBC: CPT

## 2020-12-04 PROCEDURE — X1094 NO CHARGE VISIT: HCPCS | Performed by: NURSE PRACTITIONER

## 2020-12-04 PROCEDURE — 80076 HEPATIC FUNCTION PANEL: CPT

## 2020-12-04 PROCEDURE — 36415 COLL VENOUS BLD VENIPUNCTURE: CPT

## 2020-12-04 PROCEDURE — 83735 ASSAY OF MAGNESIUM: CPT

## 2020-12-04 PROCEDURE — 10002800 HB RX 250 W HCPCS: Performed by: INTERNAL MEDICINE

## 2020-12-04 RX ORDER — POTASSIUM CHLORIDE 20 MEQ/1
40 TABLET, EXTENDED RELEASE ORAL ONCE
Status: COMPLETED | OUTPATIENT
Start: 2020-12-04 | End: 2020-12-04

## 2020-12-04 RX ORDER — ESCITALOPRAM OXALATE 10 MG/1
20 TABLET ORAL NIGHTLY
Status: CANCELLED | OUTPATIENT
Start: 2020-12-04

## 2020-12-04 RX ORDER — FAMOTIDINE 20 MG/1
20 TABLET, FILM COATED ORAL EVERY 12 HOURS SCHEDULED
Status: DISCONTINUED | OUTPATIENT
Start: 2020-12-04 | End: 2020-12-08 | Stop reason: HOSPADM

## 2020-12-04 RX ORDER — ESCITALOPRAM OXALATE 20 MG/1
20 TABLET ORAL NIGHTLY
Status: DISCONTINUED | OUTPATIENT
Start: 2020-12-04 | End: 2020-12-08 | Stop reason: HOSPADM

## 2020-12-04 RX ORDER — LANOLIN ALCOHOL/MO/W.PET/CERES
400 CREAM (GRAM) TOPICAL EVERY 8 HOURS
Status: COMPLETED | OUTPATIENT
Start: 2020-12-04 | End: 2020-12-04

## 2020-12-04 RX ADMIN — SODIUM CHLORIDE, PRESERVATIVE FREE 2 ML: 5 INJECTION INTRAVENOUS at 22:12

## 2020-12-04 RX ADMIN — SODIUM CHLORIDE, PRESERVATIVE FREE 2 ML: 5 INJECTION INTRAVENOUS at 07:55

## 2020-12-04 RX ADMIN — SODIUM CHLORIDE, SODIUM LACTATE, POTASSIUM CHLORIDE, AND CALCIUM CHLORIDE: .6; .31; .03; .02 INJECTION, SOLUTION INTRAVENOUS at 12:16

## 2020-12-04 RX ADMIN — MORPHINE SULFATE 2 MG: 2 INJECTION, SOLUTION INTRAMUSCULAR; INTRAVENOUS at 07:53

## 2020-12-04 RX ADMIN — HYDROCODONE BITARTRATE AND ACETAMINOPHEN 1 TABLET: 5; 325 TABLET ORAL at 02:48

## 2020-12-04 RX ADMIN — Medication 400 MG: at 11:26

## 2020-12-04 RX ADMIN — HEPARIN SODIUM 5000 UNITS: 5000 INJECTION INTRAVENOUS; SUBCUTANEOUS at 13:42

## 2020-12-04 RX ADMIN — Medication 400 MG: at 20:32

## 2020-12-04 RX ADMIN — POTASSIUM CHLORIDE 40 MEQ: 1500 TABLET, EXTENDED RELEASE ORAL at 22:06

## 2020-12-04 RX ADMIN — FAMOTIDINE 20 MG: 20 TABLET ORAL at 20:33

## 2020-12-04 RX ADMIN — POTASSIUM CHLORIDE 40 MEQ: 1500 TABLET, EXTENDED RELEASE ORAL at 02:48

## 2020-12-04 RX ADMIN — CEFTRIAXONE SODIUM 2000 MG: 100 INJECTION, POWDER, FOR SOLUTION INTRAVENOUS at 08:00

## 2020-12-04 RX ADMIN — POTASSIUM CHLORIDE 40 MEQ: 1500 TABLET, EXTENDED RELEASE ORAL at 07:58

## 2020-12-04 RX ADMIN — ESCITALOPRAM OXALATE 20 MG: 20 TABLET ORAL at 22:06

## 2020-12-04 RX ADMIN — SODIUM CHLORIDE, SODIUM LACTATE, POTASSIUM CHLORIDE, AND CALCIUM CHLORIDE: .6; .31; .03; .02 INJECTION, SOLUTION INTRAVENOUS at 04:33

## 2020-12-04 RX ADMIN — HYDROCODONE BITARTRATE AND ACETAMINOPHEN 1 TABLET: 5; 325 TABLET ORAL at 20:33

## 2020-12-04 RX ADMIN — HYDROCODONE BITARTRATE AND ACETAMINOPHEN 1 TABLET: 5; 325 TABLET ORAL at 11:26

## 2020-12-04 RX ADMIN — HYDROCODONE BITARTRATE AND ACETAMINOPHEN 1 TABLET: 5; 325 TABLET ORAL at 15:59

## 2020-12-04 RX ADMIN — FAMOTIDINE 20 MG: 10 INJECTION INTRAVENOUS at 07:55

## 2020-12-04 RX ADMIN — ONDANSETRON 4 MG: 2 INJECTION INTRAMUSCULAR; INTRAVENOUS at 13:39

## 2020-12-04 RX ADMIN — POTASSIUM CHLORIDE 40 MEQ: 1500 TABLET, EXTENDED RELEASE ORAL at 11:26

## 2020-12-04 RX ADMIN — HEPARIN SODIUM 5000 UNITS: 5000 INJECTION INTRAVENOUS; SUBCUTANEOUS at 20:33

## 2020-12-04 ASSESSMENT — PAIN SCALES - GENERAL
PAINLEVEL_OUTOF10: 4
PAINLEVEL_OUTOF10: 3
PAINLEVEL_OUTOF10: 6
PAINLEVEL_OUTOF10: 7
PAINLEVEL_OUTOF10: 9

## 2020-12-05 LAB
ALBUMIN SERPL-MCNC: 2.7 G/DL (ref 3.6–5.1)
ALBUMIN/GLOB SERPL: 0.8 {RATIO} (ref 1–2.4)
ALP SERPL-CCNC: 156 UNITS/L (ref 45–117)
ALT SERPL-CCNC: 27 UNITS/L
ANION GAP SERPL CALC-SCNC: 10 MMOL/L (ref 10–20)
ANION GAP SERPL CALC-SCNC: 9 MMOL/L (ref 10–20)
AST SERPL-CCNC: 49 UNITS/L
BILIRUB SERPL-MCNC: 1 MG/DL (ref 0.2–1)
BUN SERPL-MCNC: 3 MG/DL (ref 6–20)
BUN SERPL-MCNC: 4 MG/DL (ref 6–20)
BUN/CREAT SERPL: 11 (ref 7–25)
BUN/CREAT SERPL: 8 (ref 7–25)
CALCIUM SERPL-MCNC: 8.2 MG/DL (ref 8.4–10.2)
CALCIUM SERPL-MCNC: 8.3 MG/DL (ref 8.4–10.2)
CHLORIDE SERPL-SCNC: 96 MMOL/L (ref 98–107)
CHLORIDE SERPL-SCNC: 97 MMOL/L (ref 98–107)
CO2 SERPL-SCNC: 33 MMOL/L (ref 21–32)
CO2 SERPL-SCNC: 34 MMOL/L (ref 21–32)
CREAT SERPL-MCNC: 0.38 MG/DL (ref 0.67–1.17)
CREAT SERPL-MCNC: 0.38 MG/DL (ref 0.67–1.17)
DEPRECATED RDW RBC: 41.9 FL (ref 39–50)
ERYTHROCYTE [DISTWIDTH] IN BLOOD: 12.7 % (ref 11–15)
FASTING DURATION TIME PATIENT: ABNORMAL H
FASTING DURATION TIME PATIENT: ABNORMAL H
GFR SERPLBLD BASED ON 1.73 SQ M-ARVRAT: >90 ML/MIN/1.73M2
GFR SERPLBLD BASED ON 1.73 SQ M-ARVRAT: >90 ML/MIN/1.73M2
GLOBULIN SER-MCNC: 3.3 G/DL (ref 2–4)
GLUCOSE SERPL-MCNC: 141 MG/DL (ref 65–99)
GLUCOSE SERPL-MCNC: 150 MG/DL (ref 65–99)
HCT VFR BLD CALC: 40.7 % (ref 39–51)
HGB BLD-MCNC: 13.8 G/DL (ref 13–17)
MAGNESIUM SERPL-MCNC: 1.4 MG/DL (ref 1.7–2.4)
MCH RBC QN AUTO: 31 PG (ref 26–34)
MCHC RBC AUTO-ENTMCNC: 33.9 G/DL (ref 32–36.5)
MCV RBC AUTO: 91.5 FL (ref 78–100)
NRBC BLD MANUAL-RTO: 0 /100 WBC
PLATELET # BLD AUTO: 115 K/MCL (ref 140–450)
POTASSIUM SERPL-SCNC: 3.2 MMOL/L (ref 3.4–5.1)
POTASSIUM SERPL-SCNC: 3.7 MMOL/L (ref 3.4–5.1)
PROT SERPL-MCNC: 6 G/DL (ref 6.4–8.2)
RBC # BLD: 4.45 MIL/MCL (ref 4.5–5.9)
SODIUM SERPL-SCNC: 134 MMOL/L (ref 135–145)
SODIUM SERPL-SCNC: 138 MMOL/L (ref 135–145)
WBC # BLD: 4.8 K/MCL (ref 4.2–11)

## 2020-12-05 PROCEDURE — 99233 SBSQ HOSP IP/OBS HIGH 50: CPT | Performed by: INTERNAL MEDICINE

## 2020-12-05 PROCEDURE — 10004651 HB RX, NO CHARGE ITEM: Performed by: INTERNAL MEDICINE

## 2020-12-05 PROCEDURE — 10002803 HB RX 637: Performed by: INTERNAL MEDICINE

## 2020-12-05 PROCEDURE — 80048 BASIC METABOLIC PNL TOTAL CA: CPT | Performed by: INTERNAL MEDICINE

## 2020-12-05 PROCEDURE — 36415 COLL VENOUS BLD VENIPUNCTURE: CPT | Performed by: INTERNAL MEDICINE

## 2020-12-05 PROCEDURE — 80053 COMPREHEN METABOLIC PANEL: CPT | Performed by: INTERNAL MEDICINE

## 2020-12-05 PROCEDURE — 80074 ACUTE HEPATITIS PANEL: CPT | Performed by: INTERNAL MEDICINE

## 2020-12-05 PROCEDURE — 10002807 HB RX 258: Performed by: NURSE PRACTITIONER

## 2020-12-05 PROCEDURE — X1094 NO CHARGE VISIT: HCPCS | Performed by: INTERNAL MEDICINE

## 2020-12-05 PROCEDURE — 83735 ASSAY OF MAGNESIUM: CPT | Performed by: INTERNAL MEDICINE

## 2020-12-05 PROCEDURE — 85027 COMPLETE CBC AUTOMATED: CPT | Performed by: INTERNAL MEDICINE

## 2020-12-05 PROCEDURE — 10002800 HB RX 250 W HCPCS: Performed by: INTERNAL MEDICINE

## 2020-12-05 PROCEDURE — 10000002 HB ROOM CHARGE MED SURG

## 2020-12-05 PROCEDURE — 99232 SBSQ HOSP IP/OBS MODERATE 35: CPT | Performed by: INTERNAL MEDICINE

## 2020-12-05 RX ORDER — POTASSIUM CHLORIDE 20 MEQ/1
40 TABLET, EXTENDED RELEASE ORAL ONCE
Status: COMPLETED | OUTPATIENT
Start: 2020-12-05 | End: 2020-12-05

## 2020-12-05 RX ORDER — POLYETHYLENE GLYCOL 3350 17 G/17G
17 POWDER, FOR SOLUTION ORAL DAILY
Status: DISCONTINUED | OUTPATIENT
Start: 2020-12-05 | End: 2020-12-06

## 2020-12-05 RX ORDER — LANOLIN ALCOHOL/MO/W.PET/CERES
400 CREAM (GRAM) TOPICAL EVERY 8 HOURS
Status: COMPLETED | OUTPATIENT
Start: 2020-12-05 | End: 2020-12-06

## 2020-12-05 RX ADMIN — HYDROCODONE BITARTRATE AND ACETAMINOPHEN 1 TABLET: 5; 325 TABLET ORAL at 14:04

## 2020-12-05 RX ADMIN — ESCITALOPRAM OXALATE 20 MG: 20 TABLET ORAL at 22:13

## 2020-12-05 RX ADMIN — SODIUM CHLORIDE, SODIUM LACTATE, POTASSIUM CHLORIDE, AND CALCIUM CHLORIDE: .6; .31; .03; .02 INJECTION, SOLUTION INTRAVENOUS at 14:07

## 2020-12-05 RX ADMIN — HEPARIN SODIUM 5000 UNITS: 5000 INJECTION INTRAVENOUS; SUBCUTANEOUS at 22:15

## 2020-12-05 RX ADMIN — HEPARIN SODIUM 5000 UNITS: 5000 INJECTION INTRAVENOUS; SUBCUTANEOUS at 15:24

## 2020-12-05 RX ADMIN — HEPARIN SODIUM 5000 UNITS: 5000 INJECTION INTRAVENOUS; SUBCUTANEOUS at 06:20

## 2020-12-05 RX ADMIN — HYDROCODONE BITARTRATE AND ACETAMINOPHEN 1 TABLET: 5; 325 TABLET ORAL at 06:19

## 2020-12-05 RX ADMIN — Medication 400 MG: at 18:04

## 2020-12-05 RX ADMIN — FAMOTIDINE 20 MG: 20 TABLET ORAL at 22:12

## 2020-12-05 RX ADMIN — POTASSIUM CHLORIDE 40 MEQ: 1500 TABLET, EXTENDED RELEASE ORAL at 08:42

## 2020-12-05 RX ADMIN — SODIUM CHLORIDE, PRESERVATIVE FREE 2 ML: 5 INJECTION INTRAVENOUS at 08:46

## 2020-12-05 RX ADMIN — ONDANSETRON 4 MG: 2 INJECTION INTRAMUSCULAR; INTRAVENOUS at 08:57

## 2020-12-05 RX ADMIN — SODIUM CHLORIDE, PRESERVATIVE FREE 2 ML: 5 INJECTION INTRAVENOUS at 22:15

## 2020-12-05 RX ADMIN — SODIUM CHLORIDE, SODIUM LACTATE, POTASSIUM CHLORIDE, AND CALCIUM CHLORIDE: .6; .31; .03; .02 INJECTION, SOLUTION INTRAVENOUS at 20:03

## 2020-12-05 RX ADMIN — SODIUM CHLORIDE, SODIUM LACTATE, POTASSIUM CHLORIDE, AND CALCIUM CHLORIDE: .6; .31; .03; .02 INJECTION, SOLUTION INTRAVENOUS at 08:46

## 2020-12-05 RX ADMIN — POLYETHYLENE GLYCOL (3350) 17 G: 17 POWDER, FOR SOLUTION ORAL at 15:25

## 2020-12-05 RX ADMIN — POTASSIUM CHLORIDE 40 MEQ: 1500 TABLET, EXTENDED RELEASE ORAL at 18:05

## 2020-12-05 RX ADMIN — CEFTRIAXONE SODIUM 2000 MG: 100 INJECTION, POWDER, FOR SOLUTION INTRAVENOUS at 08:43

## 2020-12-05 RX ADMIN — HYDROCODONE BITARTRATE AND ACETAMINOPHEN 1 TABLET: 5; 325 TABLET ORAL at 00:47

## 2020-12-05 RX ADMIN — HYDROCODONE BITARTRATE AND ACETAMINOPHEN 1 TABLET: 5; 325 TABLET ORAL at 22:12

## 2020-12-05 RX ADMIN — HYDROCODONE BITARTRATE AND ACETAMINOPHEN 1 TABLET: 5; 325 TABLET ORAL at 09:56

## 2020-12-05 RX ADMIN — FAMOTIDINE 20 MG: 20 TABLET ORAL at 08:43

## 2020-12-05 RX ADMIN — HYDROCODONE BITARTRATE AND ACETAMINOPHEN 1 TABLET: 5; 325 TABLET ORAL at 18:05

## 2020-12-05 ASSESSMENT — PAIN SCALES - GENERAL
PAINLEVEL_OUTOF10: 9
PAINLEVEL_OUTOF10: 10
PAINLEVEL_OUTOF10: 6
PAINLEVEL_OUTOF10: 10
PAINLEVEL_OUTOF10: 9
PAINLEVEL_OUTOF10: 8
PAINLEVEL_OUTOF10: 7
PAINLEVEL_OUTOF10: 8

## 2020-12-06 LAB
ANION GAP SERPL CALC-SCNC: 11 MMOL/L (ref 10–20)
ANNOTATION COMMENT IMP: NORMAL
BUN SERPL-MCNC: 2 MG/DL (ref 6–20)
BUN/CREAT SERPL: 5 (ref 7–25)
CALCIUM SERPL-MCNC: 8.3 MG/DL (ref 8.4–10.2)
CHLORIDE SERPL-SCNC: 100 MMOL/L (ref 98–107)
CO2 SERPL-SCNC: 29 MMOL/L (ref 21–32)
CREAT SERPL-MCNC: 0.44 MG/DL (ref 0.67–1.17)
FASTING DURATION TIME PATIENT: ABNORMAL H
GFR SERPLBLD BASED ON 1.73 SQ M-ARVRAT: >90 ML/MIN/1.73M2
GLUCOSE SERPL-MCNC: 123 MG/DL (ref 65–99)
HAV IGM SER QL: NEGATIVE
HBV CORE IGM SER QL: NEGATIVE
HBV SURFACE AG SER QL: NEGATIVE
HCV AB SER QL: NEGATIVE
IMP & REVIEW OF LAB RESULTS: NORMAL
MAGNESIUM SERPL-MCNC: 1.4 MG/DL (ref 1.7–2.4)
MAGNESIUM SERPL-MCNC: 1.5 MG/DL (ref 1.7–2.4)
POTASSIUM SERPL-SCNC: 3.6 MMOL/L (ref 3.4–5.1)
SODIUM SERPL-SCNC: 136 MMOL/L (ref 135–145)

## 2020-12-06 PROCEDURE — 10004651 HB RX, NO CHARGE ITEM: Performed by: INTERNAL MEDICINE

## 2020-12-06 PROCEDURE — 10000002 HB ROOM CHARGE MED SURG

## 2020-12-06 PROCEDURE — 99232 SBSQ HOSP IP/OBS MODERATE 35: CPT | Performed by: INTERNAL MEDICINE

## 2020-12-06 PROCEDURE — 83735 ASSAY OF MAGNESIUM: CPT | Performed by: INTERNAL MEDICINE

## 2020-12-06 PROCEDURE — 99233 SBSQ HOSP IP/OBS HIGH 50: CPT | Performed by: INTERNAL MEDICINE

## 2020-12-06 PROCEDURE — 10002800 HB RX 250 W HCPCS: Performed by: INTERNAL MEDICINE

## 2020-12-06 PROCEDURE — 10002807 HB RX 258: Performed by: NURSE PRACTITIONER

## 2020-12-06 PROCEDURE — 10002803 HB RX 637: Performed by: INTERNAL MEDICINE

## 2020-12-06 PROCEDURE — 80048 BASIC METABOLIC PNL TOTAL CA: CPT | Performed by: INTERNAL MEDICINE

## 2020-12-06 PROCEDURE — 36415 COLL VENOUS BLD VENIPUNCTURE: CPT | Performed by: INTERNAL MEDICINE

## 2020-12-06 RX ORDER — MAGNESIUM SULFATE 4 G/50ML
4 INJECTION INTRAVENOUS ONCE
Status: COMPLETED | OUTPATIENT
Start: 2020-12-06 | End: 2020-12-06

## 2020-12-06 RX ORDER — POLYETHYLENE GLYCOL 3350 17 G/17G
17 POWDER, FOR SOLUTION ORAL 2 TIMES DAILY
Status: DISCONTINUED | OUTPATIENT
Start: 2020-12-06 | End: 2020-12-08 | Stop reason: HOSPADM

## 2020-12-06 RX ADMIN — POLYETHYLENE GLYCOL (3350) 17 G: 17 POWDER, FOR SOLUTION ORAL at 21:31

## 2020-12-06 RX ADMIN — SODIUM CHLORIDE, PRESERVATIVE FREE 2 ML: 5 INJECTION INTRAVENOUS at 09:17

## 2020-12-06 RX ADMIN — ESCITALOPRAM OXALATE 20 MG: 20 TABLET ORAL at 21:29

## 2020-12-06 RX ADMIN — POLYETHYLENE GLYCOL (3350) 17 G: 17 POWDER, FOR SOLUTION ORAL at 09:18

## 2020-12-06 RX ADMIN — HYDROCODONE BITARTRATE AND ACETAMINOPHEN 1 TABLET: 5; 325 TABLET ORAL at 10:05

## 2020-12-06 RX ADMIN — FAMOTIDINE 20 MG: 20 TABLET ORAL at 09:18

## 2020-12-06 RX ADMIN — POTASSIUM CHLORIDE 40 MEQ: 1500 TABLET, EXTENDED RELEASE ORAL at 09:18

## 2020-12-06 RX ADMIN — HEPARIN SODIUM 5000 UNITS: 5000 INJECTION INTRAVENOUS; SUBCUTANEOUS at 05:57

## 2020-12-06 RX ADMIN — HYDROCODONE BITARTRATE AND ACETAMINOPHEN 1 TABLET: 5; 325 TABLET ORAL at 21:58

## 2020-12-06 RX ADMIN — SODIUM CHLORIDE, PRESERVATIVE FREE 2 ML: 5 INJECTION INTRAVENOUS at 21:59

## 2020-12-06 RX ADMIN — HEPARIN SODIUM 5000 UNITS: 5000 INJECTION INTRAVENOUS; SUBCUTANEOUS at 21:30

## 2020-12-06 RX ADMIN — CEFTRIAXONE SODIUM 2000 MG: 100 INJECTION, POWDER, FOR SOLUTION INTRAVENOUS at 09:17

## 2020-12-06 RX ADMIN — SODIUM CHLORIDE, SODIUM LACTATE, POTASSIUM CHLORIDE, AND CALCIUM CHLORIDE: .6; .31; .03; .02 INJECTION, SOLUTION INTRAVENOUS at 11:45

## 2020-12-06 RX ADMIN — HYDROCODONE BITARTRATE AND ACETAMINOPHEN 1 TABLET: 5; 325 TABLET ORAL at 01:59

## 2020-12-06 RX ADMIN — HEPARIN SODIUM 5000 UNITS: 5000 INJECTION INTRAVENOUS; SUBCUTANEOUS at 14:10

## 2020-12-06 RX ADMIN — HYDROCODONE BITARTRATE AND ACETAMINOPHEN 1 TABLET: 5; 325 TABLET ORAL at 18:12

## 2020-12-06 RX ADMIN — Medication 400 MG: at 00:35

## 2020-12-06 RX ADMIN — MAGNESIUM SULFATE IN WATER 4 G: 80 INJECTION, SOLUTION INTRAVENOUS at 10:09

## 2020-12-06 RX ADMIN — HYDROCODONE BITARTRATE AND ACETAMINOPHEN 1 TABLET: 5; 325 TABLET ORAL at 05:57

## 2020-12-06 RX ADMIN — HYDROCODONE BITARTRATE AND ACETAMINOPHEN 1 TABLET: 5; 325 TABLET ORAL at 14:10

## 2020-12-06 RX ADMIN — SODIUM CHLORIDE, SODIUM LACTATE, POTASSIUM CHLORIDE, AND CALCIUM CHLORIDE: .6; .31; .03; .02 INJECTION, SOLUTION INTRAVENOUS at 01:31

## 2020-12-06 RX ADMIN — SODIUM CHLORIDE, SODIUM LACTATE, POTASSIUM CHLORIDE, AND CALCIUM CHLORIDE: .6; .31; .03; .02 INJECTION, SOLUTION INTRAVENOUS at 18:13

## 2020-12-06 RX ADMIN — FAMOTIDINE 20 MG: 20 TABLET ORAL at 21:30

## 2020-12-06 ASSESSMENT — PAIN SCALES - GENERAL
PAINLEVEL_OUTOF10: 8
PAINLEVEL_OUTOF10: 9
PAINLEVEL_OUTOF10: 9
PAINLEVEL_OUTOF10: 8
PAINLEVEL_OUTOF10: 8

## 2020-12-07 LAB
ANION GAP SERPL CALC-SCNC: 12 MMOL/L (ref 10–20)
BASOPHILS # BLD: 0 K/MCL (ref 0–0.3)
BASOPHILS NFR BLD: 1 %
BUN SERPL-MCNC: 3 MG/DL (ref 6–20)
BUN/CREAT SERPL: 6 (ref 7–25)
CALCIUM SERPL-MCNC: 9 MG/DL (ref 8.4–10.2)
CHLORIDE SERPL-SCNC: 98 MMOL/L (ref 98–107)
CO2 SERPL-SCNC: 30 MMOL/L (ref 21–32)
CREAT SERPL-MCNC: 0.47 MG/DL (ref 0.67–1.17)
DEPRECATED RDW RBC: 43.4 FL (ref 39–50)
EOSINOPHIL # BLD: 0.2 K/MCL (ref 0–0.5)
EOSINOPHIL NFR BLD: 4 %
ERYTHROCYTE [DISTWIDTH] IN BLOOD: 12.5 % (ref 11–15)
FASTING DURATION TIME PATIENT: ABNORMAL H
GFR SERPLBLD BASED ON 1.73 SQ M-ARVRAT: >90 ML/MIN/1.73M2
GLUCOSE SERPL-MCNC: 132 MG/DL (ref 65–99)
HCT VFR BLD CALC: 40 % (ref 39–51)
HGB BLD-MCNC: 13.4 G/DL (ref 13–17)
IMM GRANULOCYTES # BLD AUTO: 0.1 K/MCL (ref 0–0.2)
IMM GRANULOCYTES # BLD: 1 %
LYMPHOCYTES # BLD: 0.8 K/MCL (ref 1–4)
LYMPHOCYTES NFR BLD: 16 %
MAGNESIUM SERPL-MCNC: 2 MG/DL (ref 1.7–2.4)
MCH RBC QN AUTO: 31.1 PG (ref 26–34)
MCHC RBC AUTO-ENTMCNC: 33.5 G/DL (ref 32–36.5)
MCV RBC AUTO: 92.8 FL (ref 78–100)
MONOCYTES # BLD: 0.9 K/MCL (ref 0.3–0.9)
MONOCYTES NFR BLD: 17 %
NEUTROPHILS # BLD: 3 K/MCL (ref 1.8–7.7)
NEUTROPHILS NFR BLD: 61 %
NRBC BLD MANUAL-RTO: 0 /100 WBC
PLATELET # BLD AUTO: 179 K/MCL (ref 140–450)
POTASSIUM SERPL-SCNC: 4.4 MMOL/L (ref 3.4–5.1)
RBC # BLD: 4.31 MIL/MCL (ref 4.5–5.9)
SODIUM SERPL-SCNC: 136 MMOL/L (ref 135–145)
WBC # BLD: 4.9 K/MCL (ref 4.2–11)

## 2020-12-07 PROCEDURE — 83735 ASSAY OF MAGNESIUM: CPT | Performed by: INTERNAL MEDICINE

## 2020-12-07 PROCEDURE — 10002800 HB RX 250 W HCPCS: Performed by: INTERNAL MEDICINE

## 2020-12-07 PROCEDURE — 80048 BASIC METABOLIC PNL TOTAL CA: CPT | Performed by: INTERNAL MEDICINE

## 2020-12-07 PROCEDURE — X1094 NO CHARGE VISIT: HCPCS | Performed by: NURSE PRACTITIONER

## 2020-12-07 PROCEDURE — 10002803 HB RX 637: Performed by: INTERNAL MEDICINE

## 2020-12-07 PROCEDURE — 85025 COMPLETE CBC W/AUTO DIFF WBC: CPT | Performed by: INTERNAL MEDICINE

## 2020-12-07 PROCEDURE — 10002807 HB RX 258: Performed by: NURSE PRACTITIONER

## 2020-12-07 PROCEDURE — 36415 COLL VENOUS BLD VENIPUNCTURE: CPT | Performed by: INTERNAL MEDICINE

## 2020-12-07 PROCEDURE — 99233 SBSQ HOSP IP/OBS HIGH 50: CPT | Performed by: FAMILY MEDICINE

## 2020-12-07 PROCEDURE — 10004651 HB RX, NO CHARGE ITEM: Performed by: INTERNAL MEDICINE

## 2020-12-07 PROCEDURE — 10000002 HB ROOM CHARGE MED SURG

## 2020-12-07 PROCEDURE — 99232 SBSQ HOSP IP/OBS MODERATE 35: CPT | Performed by: NURSE PRACTITIONER

## 2020-12-07 RX ADMIN — SODIUM CHLORIDE, PRESERVATIVE FREE 2 ML: 5 INJECTION INTRAVENOUS at 21:08

## 2020-12-07 RX ADMIN — POTASSIUM CHLORIDE 40 MEQ: 1500 TABLET, EXTENDED RELEASE ORAL at 08:25

## 2020-12-07 RX ADMIN — HYDROCODONE BITARTRATE AND ACETAMINOPHEN 1 TABLET: 5; 325 TABLET ORAL at 03:24

## 2020-12-07 RX ADMIN — FAMOTIDINE 20 MG: 20 TABLET ORAL at 08:25

## 2020-12-07 RX ADMIN — HYDROCODONE BITARTRATE AND ACETAMINOPHEN 1 TABLET: 5; 325 TABLET ORAL at 12:40

## 2020-12-07 RX ADMIN — MAGNESIUM HYDROXIDE 30 ML: 400 SUSPENSION ORAL at 14:22

## 2020-12-07 RX ADMIN — HYDROCODONE BITARTRATE AND ACETAMINOPHEN 1 TABLET: 5; 325 TABLET ORAL at 21:01

## 2020-12-07 RX ADMIN — HEPARIN SODIUM 5000 UNITS: 5000 INJECTION INTRAVENOUS; SUBCUTANEOUS at 21:09

## 2020-12-07 RX ADMIN — SODIUM CHLORIDE, SODIUM LACTATE, POTASSIUM CHLORIDE, AND CALCIUM CHLORIDE: .6; .31; .03; .02 INJECTION, SOLUTION INTRAVENOUS at 03:26

## 2020-12-07 RX ADMIN — HYDROCODONE BITARTRATE AND ACETAMINOPHEN 1 TABLET: 5; 325 TABLET ORAL at 08:25

## 2020-12-07 RX ADMIN — POLYETHYLENE GLYCOL (3350) 17 G: 17 POWDER, FOR SOLUTION ORAL at 08:27

## 2020-12-07 RX ADMIN — FAMOTIDINE 20 MG: 20 TABLET ORAL at 21:02

## 2020-12-07 RX ADMIN — HEPARIN SODIUM 5000 UNITS: 5000 INJECTION INTRAVENOUS; SUBCUTANEOUS at 14:24

## 2020-12-07 RX ADMIN — POLYETHYLENE GLYCOL (3350) 17 G: 17 POWDER, FOR SOLUTION ORAL at 21:04

## 2020-12-07 RX ADMIN — HEPARIN SODIUM 5000 UNITS: 5000 INJECTION INTRAVENOUS; SUBCUTANEOUS at 06:32

## 2020-12-07 RX ADMIN — HYDROCODONE BITARTRATE AND ACETAMINOPHEN 1 TABLET: 5; 325 TABLET ORAL at 16:14

## 2020-12-07 RX ADMIN — ESCITALOPRAM OXALATE 20 MG: 20 TABLET ORAL at 21:00

## 2020-12-07 ASSESSMENT — PAIN SCALES - PAIN ASSESSMENT IN ADVANCED DEMENTIA (PAINAD)
CONSOLABILITY: NO NEED TO CONSOLE
BREATHING: NORMAL

## 2020-12-07 ASSESSMENT — PAIN SCALES - GENERAL
PAINLEVEL_OUTOF10: 9
PAINLEVEL_OUTOF10: 5
PAINLEVEL_OUTOF10: 8
PAINLEVEL_OUTOF10: 9
PAINLEVEL_OUTOF10: 9

## 2020-12-08 VITALS
OXYGEN SATURATION: 97 % | HEART RATE: 66 BPM | BODY MASS INDEX: 23.59 KG/M2 | RESPIRATION RATE: 16 BRPM | SYSTOLIC BLOOD PRESSURE: 113 MMHG | DIASTOLIC BLOOD PRESSURE: 81 MMHG | WEIGHT: 174.16 LBS | HEIGHT: 72 IN | TEMPERATURE: 98.4 F

## 2020-12-08 PROCEDURE — 10004651 HB RX, NO CHARGE ITEM: Performed by: INTERNAL MEDICINE

## 2020-12-08 PROCEDURE — 99239 HOSP IP/OBS DSCHRG MGMT >30: CPT | Performed by: FAMILY MEDICINE

## 2020-12-08 PROCEDURE — 10002803 HB RX 637: Performed by: INTERNAL MEDICINE

## 2020-12-08 PROCEDURE — 10002807 HB RX 258: Performed by: NURSE PRACTITIONER

## 2020-12-08 RX ORDER — HYDROCODONE BITARTRATE AND ACETAMINOPHEN 5; 325 MG/1; MG/1
1 TABLET ORAL EVERY 6 HOURS PRN
Qty: 12 TABLET | Refills: 0 | Status: SHIPPED | OUTPATIENT
Start: 2020-12-08

## 2020-12-08 RX ORDER — HYDROCODONE BITARTRATE AND ACETAMINOPHEN 5; 325 MG/1; MG/1
1 TABLET ORAL EVERY 6 HOURS PRN
Qty: 12 TABLET | Refills: 0 | Status: SHIPPED | OUTPATIENT
Start: 2020-12-08 | End: 2020-12-08

## 2020-12-08 RX ADMIN — POLYETHYLENE GLYCOL (3350) 17 G: 17 POWDER, FOR SOLUTION ORAL at 09:01

## 2020-12-08 RX ADMIN — HYDROCODONE BITARTRATE AND ACETAMINOPHEN 1 TABLET: 5; 325 TABLET ORAL at 13:00

## 2020-12-08 RX ADMIN — FAMOTIDINE 20 MG: 20 TABLET ORAL at 09:01

## 2020-12-08 RX ADMIN — POTASSIUM CHLORIDE 40 MEQ: 1500 TABLET, EXTENDED RELEASE ORAL at 09:01

## 2020-12-08 RX ADMIN — HYDROCODONE BITARTRATE AND ACETAMINOPHEN 1 TABLET: 5; 325 TABLET ORAL at 04:17

## 2020-12-08 RX ADMIN — SODIUM CHLORIDE, SODIUM LACTATE, POTASSIUM CHLORIDE, AND CALCIUM CHLORIDE: .6; .31; .03; .02 INJECTION, SOLUTION INTRAVENOUS at 00:40

## 2020-12-08 RX ADMIN — SODIUM CHLORIDE, PRESERVATIVE FREE 2 ML: 5 INJECTION INTRAVENOUS at 09:01

## 2020-12-08 RX ADMIN — MAGNESIUM HYDROXIDE 30 ML: 400 SUSPENSION ORAL at 04:27

## 2020-12-08 RX ADMIN — HYDROCODONE BITARTRATE AND ACETAMINOPHEN 1 TABLET: 5; 325 TABLET ORAL at 09:01

## 2020-12-08 ASSESSMENT — PAIN SCALES - GENERAL
PAINLEVEL_OUTOF10: 8
PAINLEVEL_OUTOF10: 5

## 2024-02-23 RX ORDER — GABAPENTIN 300 MG/1
300 CAPSULE ORAL 3 TIMES DAILY
COMMUNITY

## 2024-02-23 RX ORDER — ESCITALOPRAM OXALATE 10 MG/1
10 TABLET ORAL DAILY
COMMUNITY

## 2024-02-23 RX ORDER — PANTOPRAZOLE SODIUM 40 MG/1
40 TABLET, DELAYED RELEASE ORAL
COMMUNITY

## 2024-02-23 RX ORDER — METFORMIN HYDROCHLORIDE EXTENDED-RELEASE TABLETS 500 MG/1
500 TABLET, FILM COATED, EXTENDED RELEASE ORAL
COMMUNITY

## 2024-02-23 RX ORDER — GLYBURIDE 2.5 MG/1
2 TABLET ORAL
COMMUNITY

## 2024-03-12 ENCOUNTER — HOSPITAL ENCOUNTER (OUTPATIENT)
Facility: HOSPITAL | Age: 59
Setting detail: HOSPITAL OUTPATIENT SURGERY
Discharge: HOME | End: 2024-03-12
Attending: INTERNAL MEDICINE | Admitting: INTERNAL MEDICINE
Payer: COMMERCIAL

## 2024-03-12 ENCOUNTER — ANESTHESIA (OUTPATIENT)
Dept: ENDOSCOPY | Facility: HOSPITAL | Age: 59
End: 2024-03-12
Payer: COMMERCIAL

## 2024-03-12 ENCOUNTER — ANESTHESIA EVENT (OUTPATIENT)
Dept: ENDOSCOPY | Facility: HOSPITAL | Age: 59
End: 2024-03-12
Payer: COMMERCIAL

## 2024-03-12 VITALS
BODY MASS INDEX: 21.26 KG/M2 | HEART RATE: 73 BPM | SYSTOLIC BLOOD PRESSURE: 114 MMHG | RESPIRATION RATE: 16 BRPM | WEIGHT: 157 LBS | OXYGEN SATURATION: 98 % | TEMPERATURE: 98 F | HEIGHT: 72 IN | DIASTOLIC BLOOD PRESSURE: 76 MMHG

## 2024-03-12 LAB — GLUCOSE BLD-MCNC: 113 MG/DL (ref 70–99)

## 2024-03-12 PROCEDURE — 0FDG8ZX EXTRACTION OF PANCREAS, VIA NATURAL OR ARTIFICIAL OPENING ENDOSCOPIC, DIAGNOSTIC: ICD-10-PCS | Performed by: INTERNAL MEDICINE

## 2024-03-12 PROCEDURE — 0D758ZZ DILATION OF ESOPHAGUS, VIA NATURAL OR ARTIFICIAL OPENING ENDOSCOPIC: ICD-10-PCS | Performed by: INTERNAL MEDICINE

## 2024-03-12 PROCEDURE — 0DB58ZX EXCISION OF ESOPHAGUS, VIA NATURAL OR ARTIFICIAL OPENING ENDOSCOPIC, DIAGNOSTIC: ICD-10-PCS | Performed by: INTERNAL MEDICINE

## 2024-03-12 PROCEDURE — 88305 TISSUE EXAM BY PATHOLOGIST: CPT | Performed by: INTERNAL MEDICINE

## 2024-03-12 PROCEDURE — 82962 GLUCOSE BLOOD TEST: CPT

## 2024-03-12 PROCEDURE — BF47ZZZ ULTRASONOGRAPHY OF PANCREAS: ICD-10-PCS | Performed by: INTERNAL MEDICINE

## 2024-03-12 PROCEDURE — 88172 CYTP DX EVAL FNA 1ST EA SITE: CPT | Performed by: INTERNAL MEDICINE

## 2024-03-12 PROCEDURE — 88173 CYTOPATH EVAL FNA REPORT: CPT | Performed by: INTERNAL MEDICINE

## 2024-03-12 RX ORDER — DEXTROSE MONOHYDRATE 25 G/50ML
50 INJECTION, SOLUTION INTRAVENOUS
Status: DISCONTINUED | OUTPATIENT
Start: 2024-03-12 | End: 2024-03-12

## 2024-03-12 RX ORDER — NICOTINE POLACRILEX 4 MG
15 LOZENGE BUCCAL
Status: DISCONTINUED | OUTPATIENT
Start: 2024-03-12 | End: 2024-03-12

## 2024-03-12 RX ORDER — SODIUM CHLORIDE, SODIUM LACTATE, POTASSIUM CHLORIDE, CALCIUM CHLORIDE 600; 310; 30; 20 MG/100ML; MG/100ML; MG/100ML; MG/100ML
INJECTION, SOLUTION INTRAVENOUS CONTINUOUS
Status: DISCONTINUED | OUTPATIENT
Start: 2024-03-12 | End: 2024-03-12

## 2024-03-12 RX ORDER — NICOTINE POLACRILEX 4 MG
30 LOZENGE BUCCAL
Status: DISCONTINUED | OUTPATIENT
Start: 2024-03-12 | End: 2024-03-12

## 2024-03-12 RX ORDER — LIDOCAINE HYDROCHLORIDE 10 MG/ML
INJECTION, SOLUTION EPIDURAL; INFILTRATION; INTRACAUDAL; PERINEURAL AS NEEDED
Status: DISCONTINUED | OUTPATIENT
Start: 2024-03-12 | End: 2024-03-12 | Stop reason: SURG

## 2024-03-12 RX ORDER — NALOXONE HYDROCHLORIDE 0.4 MG/ML
0.08 INJECTION, SOLUTION INTRAMUSCULAR; INTRAVENOUS; SUBCUTANEOUS ONCE AS NEEDED
Status: DISCONTINUED | OUTPATIENT
Start: 2024-03-12 | End: 2024-03-12

## 2024-03-12 RX ADMIN — SODIUM CHLORIDE, SODIUM LACTATE, POTASSIUM CHLORIDE, CALCIUM CHLORIDE: 600; 310; 30; 20 INJECTION, SOLUTION INTRAVENOUS at 16:31:00

## 2024-03-12 RX ADMIN — LIDOCAINE HYDROCHLORIDE 25 MG: 10 INJECTION, SOLUTION EPIDURAL; INFILTRATION; INTRACAUDAL; PERINEURAL at 15:48:00

## 2024-03-12 RX ADMIN — SODIUM CHLORIDE, SODIUM LACTATE, POTASSIUM CHLORIDE, CALCIUM CHLORIDE: 600; 310; 30; 20 INJECTION, SOLUTION INTRAVENOUS at 15:45:00

## 2024-03-12 NOTE — H&P
History & Physical Examination    Patient Name: Cirilo Reyes  MRN: [id number]  CSN: [id number]  YOB: 1965    Diagnosis: esophageal dysphagia, lesion of pancreas , weight loss, unitentional      Present Illness:  Cirilo Reyes is a 59 year old male is here esophageal dysphagia, lesion of pancreas , weight loss, unitentional.    Body mass index is 21.29 kg/m².    Past Medical History:   Diagnosis Date    Diabetes (HCC)     Esophageal reflux     Hearing impairment     [employer] [place]    Neuropathy     Problems with swallowing     Visual impairment        Procedure: EGD EUS    Physician Pre-Sedation Assessment    Pre-Sedation Assessment:    Sedation History: Airway Assessed    ASA Classification: 2. Patient with mild systemic disease    Cardiac:    Respiratory:    Abdomen:      Plan: MAC        Current Facility-Administered Medications   Medication Dose Route Frequency    glucose (Dex4) 15 GM/59ML oral liquid 15 g  15 g Oral Q15 Min PRN    Or    glucose (Glutose) 40% oral gel 15 g  15 g Oral Q15 Min PRN    Or    glucose-vitamin C (Dex-4) chewable tab 4 tablet  4 tablet Oral Q15 Min PRN    Or    dextrose 50% injection 50 mL  50 mL Intravenous Q15 Min PRN    Or    glucose (Dex4) 15 GM/59ML oral liquid 30 g  30 g Oral Q15 Min PRN    Or    glucose (Glutose) 40% oral gel 30 g  30 g Oral Q15 Min PRN    Or    glucose-vitamin C (Dex-4) chewable tab 8 tablet  8 tablet Oral Q15 Min PRN    lactated ringers infusion   Intravenous Continuous       Allergies: No Known Allergies    Past Surgical History:   Procedure Laterality Date    CARPAL TUNNEL RELEASE Right     COLONOSCOPY      FRACTURE SURGERY Right     wrist    OTHER ACCESSORY Left     leg    OTHER SURGICAL HISTORY      jaw    OTHER SURGICAL HISTORY Left     elbow    OTHER SURGICAL HISTORY Left     leg    UPPER GI ENDOSCOPY,EXAM       History reviewed. No pertinent family history.  Social History     Tobacco Use    Smoking status: Every Day     Packs/day: 1     Types:  Cigarettes    Smokeless tobacco: Never   Substance Use Topics    Alcohol use: Yes     Alcohol/week: 2.0 standard drinks of alcohol     Types: 2 Cans of beer per week       SYSTEM Check if Review is Normal Check if Physical Exam is Normal If not normal, please explain:   HEENT [x ] [x ]    NECK & BACK [x ] [x ]    HEART [x ] [x ]    LUNGS [x ] [x ]    ABDOMEN [x ] [x ]    UROGENITAL [ ] [ ]    EXTREMITIES [ ] [ ]    OTHER        [ x ] I have discussed the risks and benefits and alternatives with the patient/family.  They understand and agree to proceed with plan of care.  [ x ] I have reviewed the History and Physical done within the last 30 days.  Any changes noted above.    Harry Grossman MD  3/12/2024  3:36 PM

## 2024-03-12 NOTE — ANESTHESIA PREPROCEDURE EVALUATION
PRE-OP EVALUATION    Patient Name: Cirilo Reyes    Admit Diagnosis: esophageal dysphagia, lesion of pancreas , weight loss, unitentional    Pre-op Diagnosis: esophageal dysphagia, lesion of pancreas , weight loss, unitentional    ESOPHAGOGASTRODUODENOSCOPY and   UPPER ENDOSCOPIC ULTRASOUND WITH FINE NEEDLE ASPIRATION    Anesthesia Procedure: ESOPHAGOGASTRODUODENOSCOPY and  UPPER ENDOSCOPIC ULTRASOUND WITH FINE NEEDLE ASPIRATION  ENDOSCOPIC ULTRASOUND (EUS)    Surgeon(s) and Role:     * Harry Grossman MD - Primary    Pre-op vitals reviewed.  Temp: 97.8 °F (36.6 °C)  Pulse: 78  Resp: 18  BP: 111/75  SpO2: 100 %  Body mass index is 21.29 kg/m².    Current medications reviewed.  Hospital Medications:   glucose (Dex4) 15 GM/59ML oral liquid 15 g  15 g Oral Q15 Min PRN    Or    glucose (Glutose) 40% oral gel 15 g  15 g Oral Q15 Min PRN    Or    glucose-vitamin C (Dex-4) chewable tab 4 tablet  4 tablet Oral Q15 Min PRN    Or    dextrose 50% injection 50 mL  50 mL Intravenous Q15 Min PRN    Or    glucose (Dex4) 15 GM/59ML oral liquid 30 g  30 g Oral Q15 Min PRN    Or    glucose (Glutose) 40% oral gel 30 g  30 g Oral Q15 Min PRN    Or    glucose-vitamin C (Dex-4) chewable tab 8 tablet  8 tablet Oral Q15 Min PRN    lactated ringers infusion   Intravenous Continuous       Outpatient Medications:     Medications Prior to Admission   Medication Sig Dispense Refill Last Dose    metFORMIN HCl ER, OSM, 500 MG (OSM) Oral Tablet 24 Hr Take 1 tablet (500 mg total) by mouth daily with breakfast.   3/11/2024    glyBURIDE 2.5 MG Oral Tab Take 2 mg by mouth daily with breakfast.   3/12/2024    escitalopram 10 MG Oral Tab Take 1 tablet (10 mg total) by mouth daily.   3/12/2024    pantoprazole 40 MG Oral Tab EC Take 1 tablet (40 mg total) by mouth every morning before breakfast.   3/12/2024    gabapentin 300 MG Oral Cap Take 1 capsule (300 mg total) by mouth 3 (three) times daily.   3/11/2024       Allergies: Patient has no known  allergies.      Anesthesia Evaluation    Patient summary reviewed.    Anesthetic Complications  (-) history of anesthetic complications         GI/Hepatic/Renal      (+) GERD                           Cardiovascular    Negative cardiovascular ROS.    Exercise tolerance: good     MET: >4                                           Endo/Other      (+) diabetes and well controlled, type 2, not using insulin                         Pulmonary    Negative pulmonary ROS.                       Neuro/Psych    Negative neuro/psych ROS.                                  Past Surgical History:   Procedure Laterality Date    CARPAL TUNNEL RELEASE Right     COLONOSCOPY      FRACTURE SURGERY Right     wrist    OTHER ACCESSORY Left     leg    OTHER SURGICAL HISTORY      jaw    OTHER SURGICAL HISTORY Left     elbow    OTHER SURGICAL HISTORY Left     leg    UPPER GI ENDOSCOPY,EXAM       Social History     Socioeconomic History    Marital status:    Tobacco Use    Smoking status: Every Day     Packs/day: 1     Types: Cigarettes    Smokeless tobacco: Never   Vaping Use    Vaping Use: Never used   Substance and Sexual Activity    Alcohol use: Yes     Alcohol/week: 2.0 standard drinks of alcohol     Types: 2 Cans of beer per week    Drug use: Never     History   Drug Use Unknown     Available pre-op labs reviewed.               Airway      Mallampati: III  Mouth opening: 3 FB  TM distance: > 6 cm  Neck ROM: full Cardiovascular    Cardiovascular exam normal.  Rhythm: regular    (-) murmur   Dental      Dental appliance(s): upper dentures and lower dentures       Pulmonary    Pulmonary exam normal.  Breath sounds clear to auscultation bilaterally.               Other findings              ASA: 2   Plan: MAC  NPO status verified and patient meets guidelines.        Comment: MAC discussed.  All questions answered.  Patient expressed understanding and agrees to proceed.    Plan/risks discussed with: patient                Present on  Admission:  **None**

## 2024-03-12 NOTE — ANESTHESIA POSTPROCEDURE EVALUATION
[facility]    Cirilo [facility] Patient Status:  Hospital Outpatient Surgery   Age/Gender 59 year old male MRN [id number]   Location [facility] ENDOSCOPY [facility] Attending Harry Grossman MD   Hosp Day # 0 PCP Wil Wahl MD       Anesthesia Post-op Note    ESOPHAGOGASTRODUODENOSCOPY with balloon dilation  and biopsies  UPPER ENDOSCOPIC ULTRASOUND WITH FINE NEEDLE ASPIRATION    Procedure Summary       Date: 03/12/24 Room / Location:  ENDOSCOPY 01 / [facility]    Anesthesia Start: 1545 Anesthesia Stop: 1631    Procedures:       ESOPHAGOGASTRODUODENOSCOPY with balloon dilation  and biopsies UPPER ENDOSCOPIC ULTRASOUND WITH FINE NEEDLE ASPIRATION      ENDOSCOPIC ULTRASOUND (EUS) Diagnosis: (hiatal hernia, barretts, esophageal ring  eus: pancreas tail lesion)    Surgeons: Harry Grossman MD Anesthesiologist: Pool Sosa MD    Anesthesia Type: MAC ASA Status: 2            Anesthesia Type: MAC    Vitals Value Taken Time   /67 03/12/24 1638   Temp  03/12/24 1639   Pulse 76 03/12/24 1639   Resp 16 03/12/24 1627   SpO2 99 % 03/12/24 1639   Vitals shown include unfiled device data.    Patient Location: Endoscopy    Anesthesia Type: MAC    Airway Patency: patent    Postop Pain Control: adequate    Mental Status: preanesthetic baseline    Nausea/Vomiting: none    Cardiopulmonary/Hydration status: stable euvolemic    Complications: no apparent anesthesia related complications    Postop vital signs: stable    Dental Exam: Unchanged from Preop    Patient to be discharged home when criteria met.

## 2024-03-12 NOTE — OPERATIVE REPORT
[facility] OPERATIVE REPORT   PATIENT NAME: Cirilo Reyes  MRN: [id number]  DATE OF OPERATION: 3/12/2024  PREOPERATIVE DIAGNOSIS: Solid food dysphagia, weight loss, abnormal pancreas with atrophy  POSTOPERATIVE DIAGNOSIS:    1.  EGD    -Long segment 9 cm circumferential Perera's esophagus without raised lesion [id number]    -Moderate 3 cm hiatal hernia    -Multiple rings in the proximal esophagus with dilation starting at 17 mm-dilation done to max of 19 mm with linear tears noted   2.  EUS    -Fatty appearing pancreatic neck and body; relative atrophy in body of pancreas    -Hypoechoic pancreatic tail with hyperechoic foci's and nodularity likely from chronic pancreatitis    PROCEDURE PERFORMED: upper endoscopy/ ENDOSCOPIC ultrasound with FNA  SEDATION MEDICATIONS: MAC  PREOPERATIVE MEDICATIONS:   PREPROCEDURE ASSESSMENT: The indication for this procedure is to assess for pancreatic mass, Perera's esophagus, esophageal stricture. The patient was identified by myself and nursing staff in the exam room. Informed consent was obtained. The patient was seen in clinic and a full H&P was obtained. On brief physical examination, airway is patent. Chest is clear. Heart has regular rate and rhythm. Abdomen is soft, nontender with good bowel sounds. A medication list was taken by nursing today and reviewed by myself. The patient is an ASA grade 2.   PROCEDURE NOTE: The procedure was completed without difficulty. The patient tolerated the procedure well.   Upper endoscopy (EGD):  The endoscope was inserted through the mouth and advanced to the level of the duodenum, 3rd portion.  Visualized portion of the esophagus, stomach including antrum, body, fundus and cardiac, and duodenum were normal except for several findings.  A long segment Perera's esophagus was noted from 26 to 35 cm from incisors.  Multiple biopsies in the esophagus were taken.  Proximal esophageal biopsies were also taken.  There was a 3 cm hiatal hernia from  35 to 38 cm (Hill type II). A 15 to 18 mm CRE balloon was inflated at the GE junction and no stricture was seen.  However as the 15 mm balloon was pulled into the proximal esophagus there were multiple rings noted.  No obvious dilation was seen at 15 mm but at 16.5 mm balloon insufflation, there were small linear tears.  Balloon was very slowly and carefully inflated serially to 17.5 mm and then to 18 mm.  A longer tear was seen in the proximal esophagus at about 20 cm from the incisors.  An 18 to 20 mm balloon was then used to inflate the balloon to 19 mm and a better dilation was seen in the proximal esophagus.  Endoscopic ultrasound (EUS):  Endoscopic ultrasound was performed using the linear echoendoscope.  Images were obtained.    LIVER: Left lobe of the liver was visualized and no mass or lesions seen.  No intrahepatic duct dilation was noted.  Portal vein was noted to come out of the liver and the portal confluence was seen and pancreas was noted.   PANCREAS:  Pancreatic neck, body, and tail were interrogated from the gastric body while the neck, head and uncinate were examined from the 1st and 2nd duodenum.    The pancreatic parenchyma in the neck and body was hyperechoic consistent with fatty infiltration.  The tail of the pancreas was more hypoechoic and had hyperechoic foci's and strands with lobulation suggestive of chronic pancreatitis.  Because of the inability to rule out a neoplasm in the tail of the pancreas, a 25-gauge FNA needle was used to aspirate cells from this region using color-flow Doppler to avoid interposing vessels.  Only 1 pass was made.  Adequate tissue was obtained.    PD  - neck: 1.4 mm  - body: 1.5 mm  - head: 1.6 mm  Pancreas divisum: no  Chronic pancreatitis changes: yes; in the tail  Neoplasm:  no   Cysts:   no  Biliary Tree:  - common hepatic duct: 4 mm  - distal: Slight irregularity was noted but no significant wall thickening.  2 mm distal CBD diameter  - stones:   no  GALLBLADDER:  visualized and was normal without stones or sludge  CELIAC AXIS:  visualized without lymphadenopathy  L ADRENAL GLAND:  visualized   L KIDNEY:  visualized   MEDIASTINUM:  visualized without lymphadenopathy  Scope was withdrawn from the patient and patient tolerated the procedure well.  FINDINGS   Proximal esophageal strictures with rings suggestive of eosinophilic esophagitis; stricture was dilated to 19 mm.   Long segment Perera's esophagus without raised lesions.  Of note within the proximal esophagus there was some more prominent pit pattern noted.  Dysplasia is to be ruled out.  Moderate hiatal hernia  No obvious pancreatic neoplasm but there was some atrophy noted in the body of the pancreas.  Some chronic pancreatitis changes focally was seen in the tail of the pancreas.  RECOMMENDATIONS: Patient is to get fecal elastase done and possibly start pancreatic replacement enzymes  DISCHARGE:  On discharge, the patient was given an after-visit summary detailing the procedure, findings, followup plans, and an updated medication list.     Thank you very much for the consultation.  I really appreciate it.    Harry Grossman MD

## 2024-05-14 ENCOUNTER — APPOINTMENT (OUTPATIENT)
Dept: GENERAL RADIOLOGY | Facility: HOSPITAL | Age: 59
DRG: 439 | End: 2024-05-14
Attending: EMERGENCY MEDICINE

## 2024-05-14 ENCOUNTER — HOSPITAL ENCOUNTER (INPATIENT)
Facility: HOSPITAL | Age: 59
LOS: 4 days | Discharge: HOME HEALTH | DRG: 439 | End: 2024-05-19
Attending: EMERGENCY MEDICINE | Admitting: HOSPITALIST

## 2024-05-14 ENCOUNTER — APPOINTMENT (OUTPATIENT)
Dept: CT IMAGING | Facility: HOSPITAL | Age: 59
DRG: 439 | End: 2024-05-14
Attending: EMERGENCY MEDICINE

## 2024-05-14 DIAGNOSIS — R63.0 ANOREXIA: ICD-10-CM

## 2024-05-14 DIAGNOSIS — R94.31 ECG ABNORMAL: ICD-10-CM

## 2024-05-14 DIAGNOSIS — E86.0 DEHYDRATION: ICD-10-CM

## 2024-05-14 DIAGNOSIS — K86.0 ALCOHOL-INDUCED CHRONIC PANCREATITIS (HCC): ICD-10-CM

## 2024-05-14 DIAGNOSIS — E87.6 HYPOKALEMIA: ICD-10-CM

## 2024-05-14 DIAGNOSIS — R10.84 ABDOMINAL PAIN, GENERALIZED: Primary | ICD-10-CM

## 2024-05-14 LAB
ALBUMIN SERPL-MCNC: 3.3 G/DL (ref 3.4–5)
ALBUMIN/GLOB SERPL: 0.8 {RATIO} (ref 1–2)
ALP LIVER SERPL-CCNC: 262 U/L
ALT SERPL-CCNC: 66 U/L
ANION GAP SERPL CALC-SCNC: 12 MMOL/L (ref 0–18)
AST SERPL-CCNC: 122 U/L (ref 15–37)
BASOPHILS # BLD AUTO: 0.05 X10(3) UL (ref 0–0.2)
BASOPHILS NFR BLD AUTO: 0.7 %
BILIRUB SERPL-MCNC: 3.2 MG/DL (ref 0.1–2)
BILIRUB UR QL STRIP.AUTO: NEGATIVE
BUN BLD-MCNC: 6 MG/DL (ref 9–23)
CALCIUM BLD-MCNC: 9.4 MG/DL (ref 8.5–10.1)
CHLORIDE SERPL-SCNC: 84 MMOL/L (ref 98–112)
CLARITY UR REFRACT.AUTO: CLEAR
CO2 SERPL-SCNC: 35 MMOL/L (ref 21–32)
COLOR UR AUTO: YELLOW
CREAT BLD-MCNC: 0.82 MG/DL
EGFRCR SERPLBLD CKD-EPI 2021: 100 ML/MIN/1.73M2 (ref 60–?)
EOSINOPHIL # BLD AUTO: 0.03 X10(3) UL (ref 0–0.7)
EOSINOPHIL NFR BLD AUTO: 0.4 %
ERYTHROCYTE [DISTWIDTH] IN BLOOD BY AUTOMATED COUNT: 12.3 %
GLOBULIN PLAS-MCNC: 4.3 G/DL (ref 2.8–4.4)
GLUCOSE BLD-MCNC: 248 MG/DL (ref 70–99)
GLUCOSE UR STRIP.AUTO-MCNC: 30 MG/DL
HCT VFR BLD AUTO: 47.8 %
HGB BLD-MCNC: 17.3 G/DL
IMM GRANULOCYTES # BLD AUTO: 0.04 X10(3) UL (ref 0–1)
IMM GRANULOCYTES NFR BLD: 0.5 %
KETONES UR STRIP.AUTO-MCNC: 10 MG/DL
LEUKOCYTE ESTERASE UR QL STRIP.AUTO: NEGATIVE
LIPASE SERPL-CCNC: 60 U/L (ref 13–75)
LYMPHOCYTES # BLD AUTO: 1.13 X10(3) UL (ref 1–4)
LYMPHOCYTES NFR BLD AUTO: 15.5 %
MAGNESIUM SERPL-MCNC: 1.5 MG/DL (ref 1.6–2.6)
MCH RBC QN AUTO: 33.7 PG (ref 26–34)
MCHC RBC AUTO-ENTMCNC: 36.2 G/DL (ref 31–37)
MCV RBC AUTO: 93.2 FL
MONOCYTES # BLD AUTO: 0.62 X10(3) UL (ref 0.1–1)
MONOCYTES NFR BLD AUTO: 8.5 %
NEUTROPHILS # BLD AUTO: 5.43 X10 (3) UL (ref 1.5–7.7)
NEUTROPHILS # BLD AUTO: 5.43 X10(3) UL (ref 1.5–7.7)
NEUTROPHILS NFR BLD AUTO: 74.4 %
NITRITE UR QL STRIP.AUTO: NEGATIVE
OSMOLALITY SERPL CALC.SUM OF ELEC: 278 MOSM/KG (ref 275–295)
PH UR STRIP.AUTO: 6 [PH] (ref 5–8)
PLATELET # BLD AUTO: 116 10(3)UL (ref 150–450)
PLATELETS.RETICULATED NFR BLD AUTO: 6.5 % (ref 0–7)
POTASSIUM SERPL-SCNC: 3.1 MMOL/L (ref 3.5–5.1)
PROT SERPL-MCNC: 7.6 G/DL (ref 6.4–8.2)
PROT UR STRIP.AUTO-MCNC: 20 MG/DL
RBC # BLD AUTO: 5.13 X10(6)UL
RBC UR QL AUTO: NEGATIVE
SODIUM SERPL-SCNC: 131 MMOL/L (ref 136–145)
SP GR UR STRIP.AUTO: >1.03 (ref 1–1.03)
UROBILINOGEN UR STRIP.AUTO-MCNC: 2 MG/DL
WBC # BLD AUTO: 7.3 X10(3) UL (ref 4–11)

## 2024-05-14 PROCEDURE — 85025 COMPLETE CBC W/AUTO DIFF WBC: CPT | Performed by: EMERGENCY MEDICINE

## 2024-05-14 PROCEDURE — 81001 URINALYSIS AUTO W/SCOPE: CPT | Performed by: EMERGENCY MEDICINE

## 2024-05-14 PROCEDURE — 82306 VITAMIN D 25 HYDROXY: CPT | Performed by: INTERNAL MEDICINE

## 2024-05-14 PROCEDURE — 93005 ELECTROCARDIOGRAM TRACING: CPT

## 2024-05-14 PROCEDURE — 80053 COMPREHEN METABOLIC PANEL: CPT

## 2024-05-14 PROCEDURE — 80053 COMPREHEN METABOLIC PANEL: CPT | Performed by: EMERGENCY MEDICINE

## 2024-05-14 PROCEDURE — 83690 ASSAY OF LIPASE: CPT | Performed by: EMERGENCY MEDICINE

## 2024-05-14 PROCEDURE — 74177 CT ABD & PELVIS W/CONTRAST: CPT | Performed by: EMERGENCY MEDICINE

## 2024-05-14 PROCEDURE — 71045 X-RAY EXAM CHEST 1 VIEW: CPT | Performed by: EMERGENCY MEDICINE

## 2024-05-14 PROCEDURE — 84590 ASSAY OF VITAMIN A: CPT | Performed by: INTERNAL MEDICINE

## 2024-05-14 PROCEDURE — 99285 EMERGENCY DEPT VISIT HI MDM: CPT

## 2024-05-14 PROCEDURE — 93010 ELECTROCARDIOGRAM REPORT: CPT

## 2024-05-14 PROCEDURE — 85025 COMPLETE CBC W/AUTO DIFF WBC: CPT

## 2024-05-14 PROCEDURE — 96375 TX/PRO/DX INJ NEW DRUG ADDON: CPT

## 2024-05-14 PROCEDURE — 96374 THER/PROPH/DIAG INJ IV PUSH: CPT

## 2024-05-14 PROCEDURE — 96361 HYDRATE IV INFUSION ADD-ON: CPT

## 2024-05-14 PROCEDURE — 83735 ASSAY OF MAGNESIUM: CPT | Performed by: EMERGENCY MEDICINE

## 2024-05-14 RX ORDER — POTASSIUM CHLORIDE 20 MEQ/1
40 TABLET, EXTENDED RELEASE ORAL ONCE
Status: COMPLETED | OUTPATIENT
Start: 2024-05-14 | End: 2024-05-14

## 2024-05-14 RX ORDER — INSULIN GLARGINE 100 [IU]/ML
INJECTION, SOLUTION SUBCUTANEOUS
COMMUNITY
Start: 2023-06-23

## 2024-05-14 RX ORDER — HYDROMORPHONE HYDROCHLORIDE 1 MG/ML
0.5 INJECTION, SOLUTION INTRAMUSCULAR; INTRAVENOUS; SUBCUTANEOUS EVERY 30 MIN PRN
Status: DISCONTINUED | OUTPATIENT
Start: 2024-05-14 | End: 2024-05-15

## 2024-05-14 RX ORDER — ONDANSETRON 2 MG/ML
4 INJECTION INTRAMUSCULAR; INTRAVENOUS ONCE
Status: COMPLETED | OUTPATIENT
Start: 2024-05-14 | End: 2024-05-14

## 2024-05-14 RX ORDER — POTASSIUM CHLORIDE 20 MEQ/1
20 TABLET, EXTENDED RELEASE ORAL 2 TIMES DAILY
Qty: 20 TABLET | Refills: 0 | Status: SHIPPED | OUTPATIENT
Start: 2024-05-14 | End: 2024-05-24

## 2024-05-14 RX ORDER — POTASSIUM CHLORIDE 1.5 G/1.58G
20 POWDER, FOR SOLUTION ORAL 2 TIMES DAILY
COMMUNITY

## 2024-05-14 RX ORDER — FUROSEMIDE 20 MG/1
20 TABLET ORAL DAILY
COMMUNITY
Start: 2024-03-29

## 2024-05-14 RX ORDER — POTASSIUM CHLORIDE 20 MEQ/1
40 TABLET, EXTENDED RELEASE ORAL ONCE
Status: DISCONTINUED | OUTPATIENT
Start: 2024-05-14 | End: 2024-05-14

## 2024-05-14 NOTE — ED INITIAL ASSESSMENT (HPI)
Pt states decreased appetite over last 3 days.  Pt states he has not eaten and is vomiting x 3 days.  Pt states feeling off balance and weak.  Pt denies fevers.  Pt states central abd pain

## 2024-05-14 NOTE — ED PROVIDER NOTES
Patient Seen in: [facility] Emergency Department      History     Chief Complaint   Patient presents with    Abdomen/Flank Pain     Stated Complaint: sent for admit for his stomach issues    Subjective:     HPI    59-year-old male, with diabetes (glyburide, insulin) reports unintentional weight loss over the past 6 months, from 200 lbs to 145 lbs. Reports loss of appetite and inability to eat. Reports occasional vomiting, with no specific pattern. Reports abdominal pain that is diffuse.  He had exploratory surgery in the groin area years ago due to a mass, which was deemed non-threatening.  CT scan done 11/19/2023 showed abnormal appearance of the pancreas thought due to pancreatitis and diabetes.  Also there was circumferential thickening of the distal esophagus with paraesophageal lymph nodes thought possibly related to chronic reflux.      Objective:   Past Medical History:    Diabetes (HCC)    Esophageal reflux    Hearing impairment    [place]    Neuropathy    Problems with swallowing    Visual impairment              Past Surgical History:   Procedure Laterality Date    Carpal tunnel release Right     Colonoscopy      Fracture surgery Right     wrist    Other accessory Left     leg    Other surgical history      jaw    Other surgical history Left     elbow    Other surgical history Left     leg    Upper gi endoscopy,exam                  Social History     Socioeconomic History    Marital status:    Tobacco Use    Smoking status: Former     Current packs/day: 1.00     Types: Cigarettes    Smokeless tobacco: Never   Vaping Use    Vaping status: Never Used   Substance and Sexual Activity    Alcohol use: Not Currently     Alcohol/week: 2.0 standard drinks of alcohol     Types: 2 Cans of beer per week    Drug use: Never              Review of Systems    Positive for stated complaint: sent for admit for his stomach issues  Other systems are as noted in HPI.  Constitutional and vital signs reviewed.       All other systems reviewed and negative except as noted above.    Physical Exam     ED Triage Vitals [05/14/24 1346]   /74   Pulse (!) 16   Resp 16   Temp 98.3 °F (36.8 °C)   Temp src Temporal   SpO2 98 %   O2 Device None (Room air)       Current:/78   Pulse 72   Temp 98.3 °F (36.8 °C) (Temporal)   Resp 16   Ht 182.9 cm (6')   Wt 65.8 kg   SpO2 95%   BMI 19.67 kg/m²       General:  Vitals as listed.  No acute distress   HEENT: Sclerae anicteric.  Conjunctivae show no pallor.  Oropharynx clear, mucous membranes moist   Lungs: good air exchange and clear   Heart: regular rate rhythm and no murmur   Abdomen: Diffusely tender with some guarding.  No hepatomegaly or splenomegaly appreciated.    Extremities: no edema, normal peripheral pulses   Neuro: Alert oriented and nonfocal      ED Course     Labs Reviewed   COMP METABOLIC PANEL (14) - Abnormal; Notable for the following components:       Result Value    Glucose 248 (*)     Sodium 131 (*)     Potassium 3.1 (*)     Chloride 84 (*)     CO2 35.0 (*)     BUN 6 (*)      (*)     ALT 66 (*)     Alkaline Phosphatase 262 (*)     Bilirubin, Total 3.2 (*)     Albumin 3.3 (*)     A/G Ratio 0.8 (*)     All other components within normal limits   MAGNESIUM - Abnormal; Notable for the following components:    Magnesium 1.5 (*)     All other components within normal limits   URINALYSIS, ROUTINE - Abnormal; Notable for the following components:    Spec Gravity >1.030 (*)     Glucose Urine 30 (*)     Ketones Urine 10 (*)     Protein Urine 20 (*)     Urobilinogen Urine 2 (*)     All other components within normal limits   CBC W/ DIFFERENTIAL - Abnormal; Notable for the following components:    .0 (*)     All other components within normal limits   LIPASE - Normal   CBC WITH DIFFERENTIAL WITH PLATELET    Narrative:     The following orders were created for panel order CBC With Differential With Platelet.  Procedure                                Abnormality         Status                     ---------                               -----------         ------                     CBC W/ DIFFERENTIAL[[id number]]          Abnormal            Final result                 Please view results for these tests on the individual orders.   RAINBOW DRAW LAVENDER   RAINBOW DRAW LIGHT GREEN   RAINBOW DRAW BLUE   RAINBOW DRAW GOLD     CT ABDOMEN+PELVIS(CONTRAST ONLY)(CPT=74177)    Result Date: 5/14/2024  CONCLUSION:  1. Diffuse decreased attenuation in liver parenchyma is consistent with hepatic steatosis. 2. Diffuse marked atrophy of pancreatic parenchyma is noted. 3. There is no other acute abnormality detected.    LOCATION:  [facility]   Dictated by (CST): Gagan Ritter MD on 5/14/2024 at 7:08 PM     Finalized by (CST): Gagan Ritter MD on 5/14/2024 at 7:12 PM       XR CHEST AP PORTABLE  (CPT=71045)    Result Date: 5/14/2024  CONCLUSION:  No acute cardiopulmonary abnormality identified.  No free air seen beneath the diaphragm.   LOCATION:  [facility]      Dictated by (CST): Ollie Hinton MD on 5/14/2024 at 6:53 PM     Finalized by (CST): Ollie Hinton MD on 5/14/2024 at 6:54 PM      I independently read the radiographs and chest x-ray shows no infiltrate  EKG    Rate, intervals and axes as noted on EKG Report.  Rate: 73  Rhythm: Sinus Rhythm  Reading: Anterolateral T wave inversions.  Description of prior EKG on 12/2/2020 also showed anterolateral ischemic changes.  Unable to see the EKG other than the reading.    ED COURSE and MDM         Differential diagnosis before testing included pancreatitis versus perforated viscus, a medical condition that poses a threat to life.    I reviewed prior external notes including CT scan done at [facility] on 11/19/2023.  Results listed above.    Given Dilaudid for pain control.    Dr. Meza, [facility] hospitalist, notified.  Consult order placed for gastroenterology.    I have discussed with the patient the results of testing, differential  diagnosis, and treatment plan. They expressed clear understanding of these instructions and agrees to the plan provided.    Disposition and Plan     Clinical Impression:  1. Abdominal pain, generalized    2. Hypokalemia    3. Dehydration    4. Anorexia    5. ECG abnormal         Disposition:  Admit  5/14/2024  9:42 pm    Follow-up:  No follow-up provider specified.      Medications Prescribed:  Current Discharge Medication List        START taking these medications    Details   potassium chloride 20 MEQ Oral Tab CR Take 1 tablet (20 mEq total) by mouth 2 (two) times daily for 10 days.  Qty: 20 tablet, Refills: 0

## 2024-05-15 ENCOUNTER — APPOINTMENT (OUTPATIENT)
Dept: MRI IMAGING | Facility: HOSPITAL | Age: 59
DRG: 439 | End: 2024-05-15
Attending: HOSPITALIST

## 2024-05-15 PROBLEM — E86.0 DEHYDRATION: Status: ACTIVE | Noted: 2024-05-15

## 2024-05-15 PROBLEM — E87.6 HYPOKALEMIA: Status: ACTIVE | Noted: 2024-05-15

## 2024-05-15 PROBLEM — R94.31 ECG ABNORMAL: Status: ACTIVE | Noted: 2024-05-15

## 2024-05-15 PROBLEM — R63.0 ANOREXIA: Status: ACTIVE | Noted: 2024-05-15

## 2024-05-15 LAB
ALBUMIN SERPL-MCNC: 1.6 G/DL (ref 3.4–5)
ALBUMIN/GLOB SERPL: 0.3 {RATIO} (ref 1–2)
ALP LIVER SERPL-CCNC: 196 U/L
ALT SERPL-CCNC: 51 U/L
ANION GAP SERPL CALC-SCNC: 8 MMOL/L (ref 0–18)
AST SERPL-CCNC: 85 U/L (ref 15–37)
ATRIAL RATE: 73 BPM
BASOPHILS # BLD AUTO: 0.05 X10(3) UL (ref 0–0.2)
BASOPHILS NFR BLD AUTO: 0.9 %
BILIRUB SERPL-MCNC: 2.5 MG/DL (ref 0.1–2)
BUN BLD-MCNC: 6 MG/DL (ref 9–23)
CALCIUM BLD-MCNC: 7.9 MG/DL (ref 8.5–10.1)
CHLORIDE SERPL-SCNC: 95 MMOL/L (ref 98–112)
CO2 SERPL-SCNC: 23 MMOL/L (ref 21–32)
CREAT BLD-MCNC: 0.46 MG/DL
EGFRCR SERPLBLD CKD-EPI 2021: 119 ML/MIN/1.73M2 (ref 60–?)
EOSINOPHIL # BLD AUTO: 0.12 X10(3) UL (ref 0–0.7)
EOSINOPHIL NFR BLD AUTO: 2.1 %
ERYTHROCYTE [DISTWIDTH] IN BLOOD BY AUTOMATED COUNT: 12 %
EST. AVERAGE GLUCOSE BLD GHB EST-MCNC: 174 MG/DL (ref 68–126)
GLOBULIN PLAS-MCNC: 4.6 G/DL (ref 2.8–4.4)
GLUCOSE BLD-MCNC: 125 MG/DL (ref 70–99)
GLUCOSE BLD-MCNC: 128 MG/DL (ref 70–99)
GLUCOSE BLD-MCNC: 135 MG/DL (ref 70–99)
HBA1C MFR BLD: 7.7 % (ref ?–5.7)
HCT VFR BLD AUTO: 41.1 %
HGB BLD-MCNC: 14.9 G/DL
IMM GRANULOCYTES # BLD AUTO: 0.03 X10(3) UL (ref 0–1)
IMM GRANULOCYTES NFR BLD: 0.5 %
LYMPHOCYTES # BLD AUTO: 1.39 X10(3) UL (ref 1–4)
LYMPHOCYTES NFR BLD AUTO: 24 %
MCH RBC QN AUTO: 34.1 PG (ref 26–34)
MCHC RBC AUTO-ENTMCNC: 36.3 G/DL (ref 31–37)
MCV RBC AUTO: 94.1 FL
MONOCYTES # BLD AUTO: 0.68 X10(3) UL (ref 0.1–1)
MONOCYTES NFR BLD AUTO: 11.7 %
NEUTROPHILS # BLD AUTO: 3.53 X10 (3) UL (ref 1.5–7.7)
NEUTROPHILS # BLD AUTO: 3.53 X10(3) UL (ref 1.5–7.7)
NEUTROPHILS NFR BLD AUTO: 60.8 %
OSMOLALITY SERPL CALC.SUM OF ELEC: 261 MOSM/KG (ref 275–295)
P AXIS: 98 DEGREES
P-R INTERVAL: 126 MS
PLATELET # BLD AUTO: 64 10(3)UL (ref 150–450)
POTASSIUM SERPL-SCNC: 3.5 MMOL/L (ref 3.5–5.1)
PROT SERPL-MCNC: 6.2 G/DL (ref 6.4–8.2)
Q-T INTERVAL: 510 MS
QRS DURATION: 82 MS
QTC CALCULATION (BEZET): 561 MS
R AXIS: 32 DEGREES
RBC # BLD AUTO: 4.37 X10(6)UL
SODIUM SERPL-SCNC: 126 MMOL/L (ref 136–145)
T AXIS: 12 DEGREES
VENTRICULAR RATE: 73 BPM
WBC # BLD AUTO: 5.8 X10(3) UL (ref 4–11)

## 2024-05-15 PROCEDURE — 85025 COMPLETE CBC W/AUTO DIFF WBC: CPT | Performed by: INTERNAL MEDICINE

## 2024-05-15 PROCEDURE — 97530 THERAPEUTIC ACTIVITIES: CPT

## 2024-05-15 PROCEDURE — 74183 MRI ABD W/O CNTR FLWD CNTR: CPT | Performed by: HOSPITALIST

## 2024-05-15 PROCEDURE — 97162 PT EVAL MOD COMPLEX 30 MIN: CPT

## 2024-05-15 PROCEDURE — 82962 GLUCOSE BLOOD TEST: CPT

## 2024-05-15 PROCEDURE — 80053 COMPREHEN METABOLIC PANEL: CPT | Performed by: INTERNAL MEDICINE

## 2024-05-15 PROCEDURE — S0028 INJECTION, FAMOTIDINE, 20 MG: HCPCS | Performed by: HOSPITALIST

## 2024-05-15 PROCEDURE — A9575 INJ GADOTERATE MEGLUMI 0.1ML: HCPCS | Performed by: HOSPITALIST

## 2024-05-15 PROCEDURE — 76376 3D RENDER W/INTRP POSTPROCES: CPT | Performed by: HOSPITALIST

## 2024-05-15 PROCEDURE — 83036 HEMOGLOBIN GLYCOSYLATED A1C: CPT

## 2024-05-15 RX ORDER — POLYETHYLENE GLYCOL 3350 17 G/17G
17 POWDER, FOR SOLUTION ORAL DAILY PRN
Status: DISCONTINUED | OUTPATIENT
Start: 2024-05-15 | End: 2024-05-19

## 2024-05-15 RX ORDER — MELATONIN
3 NIGHTLY PRN
Status: DISCONTINUED | OUTPATIENT
Start: 2024-05-15 | End: 2024-05-19

## 2024-05-15 RX ORDER — NICOTINE POLACRILEX 4 MG
15 LOZENGE BUCCAL
Status: DISCONTINUED | OUTPATIENT
Start: 2024-05-15 | End: 2024-05-19

## 2024-05-15 RX ORDER — PANTOPRAZOLE SODIUM 40 MG/1
40 TABLET, DELAYED RELEASE ORAL
Status: DISCONTINUED | OUTPATIENT
Start: 2024-05-15 | End: 2024-05-15

## 2024-05-15 RX ORDER — ACETAMINOPHEN 325 MG/1
650 TABLET ORAL EVERY 4 HOURS PRN
Status: DISCONTINUED | OUTPATIENT
Start: 2024-05-15 | End: 2024-05-19

## 2024-05-15 RX ORDER — HYDROCODONE BITARTRATE AND ACETAMINOPHEN 5; 325 MG/1; MG/1
1 TABLET ORAL EVERY 4 HOURS PRN
Status: DISCONTINUED | OUTPATIENT
Start: 2024-05-15 | End: 2024-05-19

## 2024-05-15 RX ORDER — MULTIPLE VITAMINS W/ MINERALS TAB 9MG-400MCG
1 TAB ORAL DAILY
Status: DISCONTINUED | OUTPATIENT
Start: 2024-05-16 | End: 2024-05-19

## 2024-05-15 RX ORDER — FAMOTIDINE 10 MG/ML
20 INJECTION, SOLUTION INTRAVENOUS 2 TIMES DAILY
Status: DISCONTINUED | OUTPATIENT
Start: 2024-05-15 | End: 2024-05-17

## 2024-05-15 RX ORDER — PROCHLORPERAZINE EDISYLATE 5 MG/ML
5 INJECTION INTRAMUSCULAR; INTRAVENOUS EVERY 8 HOURS PRN
Status: DISCONTINUED | OUTPATIENT
Start: 2024-05-15 | End: 2024-05-19

## 2024-05-15 RX ORDER — ACETAMINOPHEN 500 MG
500 TABLET ORAL EVERY 4 HOURS PRN
Status: DISCONTINUED | OUTPATIENT
Start: 2024-05-15 | End: 2024-05-19

## 2024-05-15 RX ORDER — SODIUM CHLORIDE 9 MG/ML
INJECTION, SOLUTION INTRAVENOUS CONTINUOUS
Status: ACTIVE | OUTPATIENT
Start: 2024-05-15 | End: 2024-05-15

## 2024-05-15 RX ORDER — SENNOSIDES 8.6 MG
17.2 TABLET ORAL NIGHTLY PRN
Status: DISCONTINUED | OUTPATIENT
Start: 2024-05-15 | End: 2024-05-19

## 2024-05-15 RX ORDER — DEXTROSE MONOHYDRATE 25 G/50ML
50 INJECTION, SOLUTION INTRAVENOUS
Status: DISCONTINUED | OUTPATIENT
Start: 2024-05-15 | End: 2024-05-19

## 2024-05-15 RX ORDER — MORPHINE SULFATE 2 MG/ML
2 INJECTION, SOLUTION INTRAMUSCULAR; INTRAVENOUS EVERY 2 HOUR PRN
Status: DISCONTINUED | OUTPATIENT
Start: 2024-05-15 | End: 2024-05-16

## 2024-05-15 RX ORDER — ENOXAPARIN SODIUM 100 MG/ML
40 INJECTION SUBCUTANEOUS DAILY
Status: DISCONTINUED | OUTPATIENT
Start: 2024-05-15 | End: 2024-05-19

## 2024-05-15 RX ORDER — ONDANSETRON 2 MG/ML
4 INJECTION INTRAMUSCULAR; INTRAVENOUS EVERY 6 HOURS PRN
Status: DISCONTINUED | OUTPATIENT
Start: 2024-05-15 | End: 2024-05-19

## 2024-05-15 RX ORDER — ESCITALOPRAM OXALATE 20 MG/1
20 TABLET ORAL DAILY
Status: DISCONTINUED | OUTPATIENT
Start: 2024-05-15 | End: 2024-05-19

## 2024-05-15 RX ORDER — MORPHINE SULFATE 2 MG/ML
1 INJECTION, SOLUTION INTRAMUSCULAR; INTRAVENOUS EVERY 2 HOUR PRN
Status: DISCONTINUED | OUTPATIENT
Start: 2024-05-15 | End: 2024-05-16

## 2024-05-15 RX ORDER — BISACODYL 10 MG
10 SUPPOSITORY, RECTAL RECTAL
Status: DISCONTINUED | OUTPATIENT
Start: 2024-05-15 | End: 2024-05-19

## 2024-05-15 RX ORDER — MORPHINE SULFATE 4 MG/ML
4 INJECTION, SOLUTION INTRAMUSCULAR; INTRAVENOUS EVERY 2 HOUR PRN
Status: DISCONTINUED | OUTPATIENT
Start: 2024-05-15 | End: 2024-05-16

## 2024-05-15 RX ORDER — SODIUM CHLORIDE 9 MG/ML
INJECTION, SOLUTION INTRAVENOUS CONTINUOUS
Status: DISCONTINUED | OUTPATIENT
Start: 2024-05-15 | End: 2024-05-17

## 2024-05-15 RX ORDER — HYDROMORPHONE HYDROCHLORIDE 1 MG/ML
0.5 INJECTION, SOLUTION INTRAMUSCULAR; INTRAVENOUS; SUBCUTANEOUS EVERY 30 MIN PRN
Status: DISPENSED | OUTPATIENT
Start: 2024-05-15 | End: 2024-05-15

## 2024-05-15 RX ORDER — NICOTINE POLACRILEX 4 MG
30 LOZENGE BUCCAL
Status: DISCONTINUED | OUTPATIENT
Start: 2024-05-15 | End: 2024-05-19

## 2024-05-15 RX ORDER — HYDROCODONE BITARTRATE AND ACETAMINOPHEN 5; 325 MG/1; MG/1
2 TABLET ORAL EVERY 4 HOURS PRN
Status: DISCONTINUED | OUTPATIENT
Start: 2024-05-15 | End: 2024-05-18

## 2024-05-15 RX ORDER — SALIVA STIMULANT COMB. NO.3
SPRAY, NON-AEROSOL (ML) MUCOUS MEMBRANE AS NEEDED
Status: DISCONTINUED | OUTPATIENT
Start: 2024-05-15 | End: 2024-05-19

## 2024-05-15 RX ORDER — ONDANSETRON 2 MG/ML
4 INJECTION INTRAMUSCULAR; INTRAVENOUS EVERY 4 HOURS PRN
Status: DISCONTINUED | OUTPATIENT
Start: 2024-05-15 | End: 2024-05-15

## 2024-05-15 RX ORDER — ENEMA 19; 7 G/133ML; G/133ML
1 ENEMA RECTAL ONCE AS NEEDED
Status: DISCONTINUED | OUTPATIENT
Start: 2024-05-15 | End: 2024-05-19

## 2024-05-15 RX ORDER — GADOTERATE MEGLUMINE 376.9 MG/ML
15 INJECTION INTRAVENOUS
Status: COMPLETED | OUTPATIENT
Start: 2024-05-15 | End: 2024-05-15

## 2024-05-15 NOTE — H&P
[facility]   part of [facility]    History and Physical     Cirilo Reyes Patient Status:  Inpatient    3/7/1965 MRN [id number]   Location [facility] 5NW-A Attending Ryan Betts MD   Hosp Day # 0 PCP Wil Wahl MD     Chief Complaint:   Chief Complaint   Patient presents with    Abdomen/Flank Pain       History of Present Illness: Cirilo Reyes is a 59 year old male with DM2 with neuropathy, TCP who presented for evaluation of 6 monthos of progressive anorexia, early satiety, occasional vomiting and diffuse abdominal pain. He reported the feeling of foot getting \"stuck in his esophagus.\" Per ED report he has gone from 200 lbs to 145 lbs. (Weighed 156 lbs at pcp visit 2024). Reports history alcoholism in the past.     He was seen at his PCP and directed to the ED for urgent evaluation after OP labs showed hyponatremia & hypokalemia.    On arrival he was afebrile, HDS, 98% on room air. Labs notable for Na 131, K 3.1, , , ALT 66, t-Bili 3.2. CBC unremarkable. Lipase normal. CXR normal. CT Abd/Pelvis w/ hepatic steatosis, diffuse marked atrophy of pancreatic parenchyma.He was given iv dilaudid and GI consult order placed. Overnight there were no acute events and he remained afebrile and hemodynamically stable.     This morning he continues to endorse generalized abdominal tenderness, worst in the right upper quadrant, but denies any other new or worsening symptoms. He reports dysphagia symptoms are chronic, and has had EGD with esophageal dilation in the past with Dr. Grossman, but the anorexia/early satiety and weight loss are new.     Past Medical History:  Past Medical History:    Diabetes (HCC)    Esophageal reflux    Hearing impairment    [place]    Neuropathy    Problems with swallowing    Visual impairment        Past Surgical History:   Past Surgical History:   Procedure Laterality Date    Carpal tunnel release Right     Colonoscopy      Fracture surgery Right     wrist     Other accessory Left     leg    Other surgical history      jaw    Other surgical history Left     elbow    Other surgical history Left     leg    Upper gi endoscopy,exam         Social History:  reports that he has quit smoking. His smoking use included cigarettes. He has never used smokeless tobacco. He reports that he does not currently use alcohol after a past usage of about 2.0 standard drinks of alcohol per week. He reports that he does not use drugs.    Family History: History reviewed. No pertinent family history.    Allergies: No Known Allergies    Medications:    No current facility-administered medications on file prior to encounter.     Current Outpatient Medications on File Prior to Encounter   Medication Sig Dispense Refill    potassium chloride 20 MEQ Oral Powd Pack Take 20 mEq by mouth 2 (two) times daily.      furosemide 20 MG Oral Tab Take 1 tablet (20 mg total) by mouth daily.      insulin glargine (LANTUS SOLOSTAR) 100 UNIT/ML Subcutaneous Solution Pen-injector       metFORMIN HCl ER, OSM, 500 MG (OSM) Oral Tablet 24 Hr Take 1 tablet (500 mg total) by mouth daily with breakfast.      escitalopram 10 MG Oral Tab Take 1 tablet (10 mg total) by mouth daily.      glyBURIDE 2.5 MG Oral Tab Take 2 mg by mouth daily with breakfast. (Patient not taking: Reported on 5/14/2024)      pantoprazole 40 MG Oral Tab EC Take 1 tablet (40 mg total) by mouth every morning before breakfast.      gabapentin 300 MG Oral Cap Take 1 capsule (300 mg total) by mouth 3 (three) times daily. (Patient not taking: Reported on 5/14/2024)         Review of Systems:   A comprehensive 14 point review of systems was completed.    Pertinent positives and negatives noted in the HPI.    Physical Exam:    /75 (BP Location: Left arm)   Pulse 68   Temp 98.3 °F (36.8 °C) (Oral)   Resp 19   Ht 6' (1.829 m)   Wt 145 lb (65.8 kg)   SpO2 98%   BMI 19.67 kg/m²     General: No acute distress. Comfortable, nontoxic though  cachectic/chronically ill appearing   HEENT: Normocephalic atraumatic. Moist mucous membranes; Sclera anicteric. +bitemporal wasting   Neck: Supple, no JVD  Respiratory: Clear to auscultation bilaterally. Reg resp rate & effort, no wheezes/crackles   Cardiovascular: S1, S2. Regular rate and rhythm. No murmurs appreciable   Chest and Back: No tenderness or deformity.  Abdomen: Soft, mild RUQ TTP with hepatomegaly, no guarding/rebound; no ascites   Neurologic: No focal neurological deficits. CNII-XII grossly intact.  Musculoskeletal: Moves all extremities.  Extremities: No edema or cyanosis.  Integument: No rashes or lesions.   Psychiatric: Appropriate mood and affect.      Diagnostic Data:      Labs:  Recent Labs   Lab 05/14/24  1357   WBC 7.3   HGB 17.3   MCV 93.2   .0*       Recent Labs   Lab 05/14/24  1357   *   BUN 6*   CREATSERUM 0.82   CA 9.4   ALB 3.3*   *   K 3.1*   CL 84*   CO2 35.0*   ALKPHO 262*   *   ALT 66*   BILT 3.2*   TP 7.6       Estimated Creatinine Clearance: 88 mL/min (based on SCr of 0.82 mg/dL).    No results for input(s): \"PTP\", \"INR\" in the last 168 hours.    No results for input(s): \"TROP\", \"CK\" in the last 168 hours.    Imaging: Imaging data reviewed in Epic.      ASSESSMENT / PLAN:     Cirilo Reyes Is a a 59 year old male who presents with dysphagia, progressive anorexia and severe (~25%) weight loss, found to have LFT elevation & atrophic pancreas on imaging     Problem List / Diagnoses    Dysphagia  History Esophageal Stricture  Anorexia  Weight Loss   Early Satiety   LFT Elevation / Pancreatic Atrophy   HERBERT (generalized anxiety disorder)  Major depression, recurrent, chronic (HCC)  Type 2 diabetes mellitus with hyperglycemia, without long-term current use of insulin (HCC)    Plan    Dysphagia  History Esophageal Stricture  Perera's  -- reports history esophageal stricture s/p dilation with temporary relief  -- follows with GI as outpatient, s/p EGD 3/2024  here with findings of 9cm circumferential Perera's Esophagus, multiple rings in proximal esophagus s/p dilation   -- GI consulted, plan discussed  -- would likely benefit from EGD/Colonoscopy this admission   -- iv famotidine BID while NPO     Anorexia  Weight Loss   Early Satiety   -- suspect secondary to chronic pancreatitis (below)  -- nutrition consult     LFT Elevation / Pancreatic Atrophy   Chronic Pancreatitis   -- Outpatient CT 11/2023 showed \"Abnormal appearance of the pancreas with focal atrophy of the mid pancreatic body and question of   tiny pancreatic tail hypodensities. This may relate to sequela of pancreatitis and diabetes,\"   -- MRI Pancreas recommended but never completed  -- GI following per above, plan discussed   -- Check MRCP   -- ok for clear liquid diet following MRCP    HERBERT (generalized anxiety disorder)  Major depression, recurrent, chronic (HCC)  -- holding home meds for now, resume lexapro as able    Type 2 diabetes mellitus with hyperglycemia, without long-term current use of insulin (HCC)  -- accuchecks, SSI-low     DVT Mechanical Prophylaxis:   SCDs,    DVT Pharmacologic Prophylaxis   Medication    enoxaparin (Lovenox) 40 MG/0.4ML SUBQ injection 40 mg              Code Status: Not on file    Dispo: inpatient; HOWIE > 2 midnights     Plan of care discussed with patient and/or family at bedside.    KANDI Betts MD  [facility]   [phone]

## 2024-05-15 NOTE — PROGRESS NOTES
NURSING ADMISSION NOTE      Patient admitted via Cart  Oriented to room.  Safety precautions initiated.  Bed in low position.  Call light in reach.    Patient Aox4. Patient from home alone. Glasses. Full dentures. , room air. Tele NSR. Lovenox. NPO. Patient reports poor appetite. BM 5/13. Reports pain to his abdomen. PRN med per MAR. Call light within reach. Updated on POC. No further needs at this time. GI consulted.

## 2024-05-15 NOTE — ED QUICK NOTES
Orders for admission, patient is aware of plan and ready to go upstairs. Any questions, please call ED RN MOISE at extension [phone].     Patient Covid vaccination status: Unvaccinated     COVID Test Ordered in ED: None    COVID Suspicion at Admission: N/A    Running Infusions:  None    Mental Status/LOC at time of transport: ALERT X 4    Other pertinent information: SIGNIFICANT WEIGHT LOSS. PAIN TOLERABLE  CIWA score: N/A   NIH score:  N/A

## 2024-05-15 NOTE — CONSULTS
[facility] IP Report of Consultation Gastroenterology    5/15/2024    Cirilo Reyes  male   Gabriele Luis MD     [id number]  3/7/1965 Primary Care Physician  Wil Wahl MD     Reason for Consultation: wt loss, esophageal dysphagia    HPI: 59M known to Replaced by [facility] GI team, recently seen by Dr. Montes in outpt GI Clinic 4/2024 (~3wks ago) + prior EGD/EUS during admission 3/2024. Notes reviewed, d/w'd pt at length today.    FH panc CA (2 MA's >59yo at dx). Prior h/o acute pancreatitis (etoh-induced per pt), +chronic tob dependence (at least 0.5-1ppd x40yrs, ongoing use, advised to quit). No etoh intake for 6-8 months.    PROBLEM LIST:     Patient Active Problem List   Diagnosis    Abdominal pain, generalized    Hypokalemia    Dehydration    Anorexia    ECG abnormal       PATIENT HISTORY:     Past Medical History:    Diabetes (HCC)    Esophageal reflux    Hearing impairment    [place]    Neuropathy    Problems with swallowing    Visual impairment      Past Surgical History:   Procedure Laterality Date    Carpal tunnel release Right     Colonoscopy      Fracture surgery Right     wrist    Other accessory Left     leg    Other surgical history      jaw    Other surgical history Left     elbow    Other surgical history Left     leg    Upper gi endoscopy,exam        History reviewed. No pertinent family history.   Social History     Socioeconomic History    Marital status:    Tobacco Use    Smoking status: Former     Current packs/day: 1.00     Types: Cigarettes    Smokeless tobacco: Never   Vaping Use    Vaping status: Never Used   Substance and Sexual Activity    Alcohol use: Not Currently     Alcohol/week: 2.0 standard drinks of alcohol     Types: 2 Cans of beer per week    Drug use: Never     Social Determinants of Health     Food Insecurity: No Food Insecurity (5/15/2024)    Food Insecurity     Food Insecurity: Never true   Transportation Needs: Unmet Transportation Needs (5/15/2024)    Transportation Needs      Lack of Transportation: Yes   Housing Stability: Low Risk  (5/15/2024)    Housing Stability     Housing Instability: No        Allergies;  No Known Allergies     Medications:    Current Facility-Administered Medications:     sodium chloride 0.9% infusion, , Intravenous, Continuous    enoxaparin (Lovenox) 40 MG/0.4ML SUBQ injection 40 mg, 40 mg, Subcutaneous, Daily    polyethylene glycol (PEG 3350) (Miralax) 17 g oral packet 17 g, 17 g, Oral, Daily PRN    sennosides (Senokot) tab 17.2 mg, 17.2 mg, Oral, Nightly PRN    bisacodyl (Dulcolax) 10 MG rectal suppository 10 mg, 10 mg, Rectal, Daily PRN    fleet enema (Fleet) 7-19 GM/118ML rectal enema 133 mL, 1 enema, Rectal, Once PRN    ondansetron (Zofran) 4 MG/2ML injection 4 mg, 4 mg, Intravenous, Q6H PRN    prochlorperazine (Compazine) 10 MG/2ML injection 5 mg, 5 mg, Intravenous, Q8H PRN    melatonin tab 3 mg, 3 mg, Oral, Nightly PRN    morphINE PF 2 MG/ML injection 1 mg, 1 mg, Intravenous, Q2H PRN **OR** morphINE PF 2 MG/ML injection 2 mg, 2 mg, Intravenous, Q2H PRN **OR** morphINE PF 4 MG/ML injection 4 mg, 4 mg, Intravenous, Q2H PRN    acetaminophen (Tylenol) tab 650 mg, 650 mg, Oral, Q4H PRN **OR** HYDROcodone-acetaminophen (Norco) 5-325 MG per tab 1 tablet, 1 tablet, Oral, Q4H PRN **OR** HYDROcodone-acetaminophen (Norco) 5-325 MG per tab 2 tablet, 2 tablet, Oral, Q4H PRN    acetaminophen (Tylenol Extra Strength) tab 500 mg, 500 mg, Oral, Q4H PRN    escitalopram (Lexapro) tab 10 mg, 10 mg, Oral, Daily    famotidine (Pepcid) 20 mg/2mL injection 20 mg, 20 mg, Intravenous, BID     REVIEW OF SYSTEMS:   10pt ROS performed and o/w negative, see HPI for pertinent details.      EXAM:   /75 (BP Location: Left arm)   Pulse 68   Temp 98.3 °F (36.8 °C) (Oral)   Resp 19   Ht 6' (1.829 m)   Wt 145 lb (65.8 kg)   SpO2 98%   BMI 19.67 kg/m²  Body mass index is 19.67 kg/m².  Gen: cooperative, pleasant, not diaphoretic, nad   HEENT: ncat, normal neck ROM, normal op w/o  ulcer/exudate, anicteric, mmm   Resp/Chest: normal respiratory effort, not dyspneic, no incr WOB/cough  CV: no reported palpitations or syncope  Abd: nt, nd, no clinical features suggestive of peritoneal s/s noted  Ext: no c/c/e   Skin: warm, perfused, no jaundice, no pallor  Neuro: aaox3, grossly intact, no asterixis noted, normal speech       LAB/IMAGING RESULTS:     Lab Results   Component Value Date    WBC 7.3 05/14/2024    HGB 17.3 05/14/2024    HCT 47.8 05/14/2024    .0 05/14/2024     [unfilled]  Lab Results   Component Value Date    WBC 7.3 05/14/2024    HGB 17.3 05/14/2024    HCT 47.8 05/14/2024    .0 05/14/2024    CREATSERUM 0.82 05/14/2024    BUN 6 05/14/2024     05/14/2024    K 3.1 05/14/2024    CL 84 05/14/2024    CO2 35.0 05/14/2024     05/14/2024    CA 9.4 05/14/2024    ALB 3.3 05/14/2024    ALKPHO 262 05/14/2024    BILT 3.2 05/14/2024    TP 7.6 05/14/2024     05/14/2024    ALT 66 05/14/2024    LIP 60 05/14/2024    MG 1.5 05/14/2024     Contrast CT AP 5/14/24  LIVER:  There is diffuse decreased attenuation in liver parenchyma with sparing around the gallbladder which most likely indicates hepatic steatosis and focal sparing.  BILIARY:  No visible dilatation or calcification.    PANCREAS:  There is moderate to severe diffuse atrophy of pancreatic parenchyma which has progressed significantly since the prior study.  SPLEEN:  No enlargement or focal lesion.    KIDNEYS:  Nonobstructing 4 mm calcification upper pole right kidney is noted.  There are no obstructing stones on the left.  ADRENALS:  No mass or enlargement.    AORTA/VASCULAR:  Aorta is atherosclerotic but not aneurysmal.  RETROPERITONEUM:  No mass or adenopathy.    BOWEL/MESENTERY:  No visible mass, obstruction, or bowel wall thickening.    ABDOMINAL WALL:  No mass or hernia.    URINARY BLADDER:  No visible focal wall thickening, lesion, or calculus.    PELVIC NODES:  No adenopathy.    PELVIC ORGANS:  No  visible mass.  Pelvic organs appropriate for patient age.    BONES:  There is degenerative disc disease in the lumbar spine and in both hips.  LUNG BASES:  No visible pulmonary or pleural disease.    OTHER:  Negative.                     Impression   CONCLUSION:    1. Diffuse decreased attenuation in liver parenchyma is consistent with hepatic steatosis.  2. Diffuse marked atrophy of pancreatic parenchyma is noted.  3. There is no other acute abnormality detected.       EGD + EUS 3/12/24  PREOPERATIVE DIAGNOSIS: Solid food dysphagia, weight loss, abnormal pancreas with atrophy  POSTOPERATIVE DIAGNOSIS:                1.  EGD                            -Long segment 9 cm circumferential Perera's esophagus without raised lesion [id number]                            -Moderate 3 cm hiatal hernia                            -Multiple rings in the proximal esophagus with dilation starting at 17 mm-dilation done to max of 19 mm with linear tears noted               2.  EUS                            -Fatty appearing pancreatic neck and body; relative atrophy in body of pancreas                            -Hypoechoic pancreatic tail with hyperechoic foci's and nodularity likely from chronic pancreatitis     A.  Biopsy, esophagus at 34 cm:  -Fragments of junctional glandular mucosa with acute and chronic inflammation and reparative changes.  -No goblet cells identified.  -No evidence of dysplasia or malignancy.     B.  Biopsy, esophagus at 30 cm:  -Specialized intestinal columnar epithelium, consistent with Perera's esophagus.  -No evidence of dysplasia or malignancy.     C.  Biopsy, esophagus at 26 cm:  -Fragments of junctional squamous and glandular epithelium with areas of specialized intestinal columnar epithelium, consistent with Perear's esophagus.  -No evidence of dysplasia or malignancy.     D.  Biopsy, esophagus at 22 cm:  -Fragments of esophageal squamous mucosa with reflux type changes.  -No glandular epithelium  present.  -No evidence of dysplasia or malignancy.    ASSESSMENT AND PLAN:   #H/o prior acute pancreatitis, imaging features suggestive of pancreatic atrophy (likely chronic per EUS 3/2024)  #Perera's Esophagus  #Esophageal dysphagia, complicated strictures noted on EGD 3/2024  #Chronic tobacco dependence (advised to quit)  #H/o alcohol use (not currently)  #Family history of pancreatic CA    Likely a chronic problem here, but will exclude urgent/acute issues this admission.  Advised pt that further evaluation + mgmt may need to take place as outpt w/ ongoing GI + PCP f/u. Care boundaries + expectations reviewed.  Obtain Contrast MRI/MRCP to reassess HPB anatomy, r/o interval lesion development given persistent wt loss since EUS 3/2024.  Check Fecal Elastase + Qual Fecal Fat now.  Start PERT pending findings. Pt was unable to start these as outpt d/t cost.  Move forward w/ repeat diag MAC EGD +/- dilation and COL tomorrow given ongoing wt loss + esophageal dysphagia.  May need re-dilation of the complex stricturing noted on 3/2024 EGD + EUS.  Expedite planned COL (scheduled in June), can do this admission as well.  Known Perera's Esophagus per 3/2024 EGD bx.  Continue PPI + daily MVI, strongly advise tob cessation.  Continue etoh abstinence as well.  Strongly recommend continued etoh abstinence + quitting tobacco use.  Counseled on impacts on HPB function over time, may accelerate loss of function + CA risk.  May need to undergo Pulm evaluation as outpt to evaluate for underlying COPD, particularly as this could incr catabolism d/t energy demands and also contribute to chronic/ongoing wt loss.  Will continue to follow w/ you.  Discussed w/ Hospitalist + RN today.  Discussed w/ pt + friend at bedside today.    The patient indicates understanding of these issues and agrees to the plan.      A total of 80 minutes was spent in direct patient care + assessment, chart review, and care coordination today.    Gabrilee  MD Toby  Department of Gastroenterology  [facility]  5/15/2024  8:00 AM     no

## 2024-05-15 NOTE — DIETARY MALNUTRITION NOTE
[facility]   part of [facility]  NUTRITION ASSESSMENT    Pt meets severe malnutrition criteria at this time.    CRITERIA FOR MALNUTRITION DIAGNOSIS:  Criteria for severe malnutrition diagnosis: chronic illness related to wt loss greater than 20% in 1 year, energy intake less than 75% for greater than 1 month, body fat severe depletion, and muscle mass severe depletion    NUTRITION INTERVENTION:    RD nutrition Care Plan- Initiated ONS (oral nutritional supplements), Ordered multivitamin, and See RD nutrition assessment for additional recommendations  Meal and Snacks - ADAT per GI; monitor patient po intake. Encourage adequate po of appropriate diet.  Medical Food Supplements - RD added Ensure Clear berry TID while on clear liquids; adjust to Glucerna BID once diet advanced. Rationale/use for oral supplements discussed.  Vitamin and Mineral Supplements - Recommend adding Multivitamin with minerals  Coordination of Nutrition Care - Recommend GI/Surgery consult for nutrition support/diet advancement     PATIENT STATUS: 59 year old male admitted on 5/14 presents with abdominal pain. Pt screened d/t MST score 5. Visited pt at bedside. Pt reports poor appetite/PO intake with no desire to eat which has just been worsening. Reports he didn't eat anything for 4 days PTA and still doesn't feel hungry. Reports his UBW 9 months ago was 200 lbs with current wt 145 lbs. Reports having abdominal pain which has been constant over the past 4 days and is usually associated with what he eats. Also having N/V daily over the past week. Reports last BM 2 days ago but with constipation d/t poor PO intake. Reports having long standing hx of swallowing difficulty and has had multiple UGI w/o and dilations. NKFA. Reports he likes Ensure but is it expensive and he does not have any reliable transportation to go to store. Offered ONS while here and pt agreeable to berry Ensure Clear and any flavor ONS once diet advanced.    PMH:  Diabetes, Esophageal reflux, Neuropathy, Problems with swallowing    ANTHROPOMETRICS:  Ht: 182.9 cm (6')  Wt: 65.8 kg (145 lb).   BMI: Body mass index is 19.67 kg/m².  IBW: 80.9 kg    WEIGHT HISTORY: Pt reports ~55 lb wt loss x 9 months (27.5%, significant per standards); noted 12 lb wt loss x 2 months per chart (7.6%).  Patient Weight(s) for the past 336 hrs:   Weight   05/15/24 0106 65.8 kg (145 lb)   05/14/24 1346 65.8 kg (145 lb)       Wt Readings from Last 10 Encounters:   05/15/24 65.8 kg (145 lb)   05/15/24 65.8 kg (145 lb 1 oz)   03/12/24 71.2 kg (157 lb)   01/20/14 86.2 kg (190 lb 2 oz)        NUTRITION:  Diet:       Procedures    Clear liquid diet Calorie Restriction/Carb Controlled: 1800 kcal/60 grams; Is Patient on Accuchecks? No    NPO        Percent Meals Eaten (last 3 days)       None            Food Allergies: No  Cultural/Ethnic/Taoist Preferences Addressed: Yes    GI SYSTEM REVIEW: swallowing dysfunction, constipation, emesis, nausea, and abdominal pain  Skin/Wounds: WNL    NUTRITION RELATED PHYSICAL FINDINGS:     1. Body Fat/Muscle Mass: moderate depletion body fat Buccal fat pad, severe depletion body fat Triceps, moderate muscle depletion Temple region and Clavicle region, and severe muscle depletion Thigh region and Calf region     2. Fluid Accumulation: none per RN documentation     NUTRITION PRESCRIPTION: 65.8 kg  Calories: 3540-2958 calories/day (30-35 kcal/kg)  Protein: 79-99 grams protein/day (1.2-1.5 gm/kg)  Fluid: ~1 ml/kcal or per MD discretion    NUTRITION DIAGNOSIS/PROBLEM:  Malnutrition related to physiological causes as evidenced by documented/reported insufficient oral intake, documented/reported unintentional weight loss, loss of fat mass, and loss of muscle mass    MONITOR AND EVALUATE/NUTRITION GOALS:  PO intake of 75% of meals TID - New  PO intake of 75% of oral nutrition supplement/s - New  Weight stable within 1 to 2 lbs during admission - New      MEDICATIONS:  Pepcid,  prn zofran  Gtt: NS at 75 ml/hr    LABS:  , Na 126    Pt is at High nutrition risk    Azeb Gramajo RD, LDN, CNSC  Clinical Dietitian  Spectra: [phone]

## 2024-05-15 NOTE — SPIRITUAL CARE NOTE
Spiritual Care Visit Note    Patient Name: Cirilo Reyes Date of Spiritual Care Visit: 05/15/24   : 3/7/1965 Primary Dx: Abdominal pain, generalized       Referred By: Referral From: Nurse    Spiritual Care Taxonomy:    Intended Effects: Aligning care plan with patient's values    Methods: Collaborate with care team member    Interventions: Assist someone with Advance Directives    Visit Type/Summary:     - PoA: Other: Left PoA information and Spiritual Care contact information.    Spiritual Care support can be requested via an Epic consult. For urgent/immediate needs, please contact the On Call  at: [place]: ext [phone]    Rev. Mari Diehl MDiv

## 2024-05-15 NOTE — PHYSICAL THERAPY NOTE
PHYSICAL THERAPY EVALUATION - INPATIENT     Room Number: 514/514-A  Evaluation Date: 5/15/2024  Type of Evaluation: Initial  Physician Order: PT Eval and Treat    Presenting Problem: dizziness  Co-Morbidities : abdominal pain, dehydration  Reason for Therapy: Mobility Dysfunction and Discharge Planning    PHYSICAL THERAPY ASSESSMENT   Patient is currently functioning below baseline with bed mobility, transfers, and gait.  Prior to admission, patient's baseline is independent.  Patient is requiring minimal assist as a result of the following impairments: decreased functional strength, decreased endurance/aerobic capacity, and impaired dynamic  balance.  Physical Therapy will continue to follow for duration of hospitalization.    Patient will benefit from continued skilled PT Services at discharge to promote functional independence in home.  Anticipate patient will return home with home health PT.    PLAN  PT Treatment Plan: Bed mobility;Body mechanics;Endurance;Energy conservation;Patient education;Gait training;Strengthening;Balance training;Stair training;Transfer training  Rehab Potential : Good  Frequency (Obs):  (2-3x/ week)  Number of Visits to Meet Established Goals: 3      CURRENT GOALS    Goal #1      Goal #2 Patient is able to demonstrate transfers Sit to/from Stand at assistance level: modified independent     Goal #3 Patient is able to ambulate 250 feet with assist device: LRAD at assistance level: modified independent     Goal #4    Goal #5    Goal #6    Goal Comments: Goals established on 5/15/2024      PHYSICAL THERAPY MEDICAL/SOCIAL HISTORY  History related to current admission: Patient is a 59 year old male admitted on 5/14/2024 from home for abdominal pain and unintentional weight loss.  Pt diagnosed with hepatic steatosis.      HOME SITUATION  Type of Home: Other (Comment) (motel)   Home Layout: One level                Lives With: Alone     Patient Owned Equipment: None  Patient Regularly  Uses: Glasses    Prior Level of [place]: Pt was independent with all levels of mobility PTA. Pt reports previously biking, swimming, and walking recreationally.    SUBJECTIVE  \"I'm just doing what they tell me.\"      OBJECTIVE  Precautions: Bed/chair alarm  Fall Risk: High fall risk    WEIGHT BEARING RESTRICTION  Weight Bearing Restriction: None                PAIN ASSESSMENT  Ratin (just administered pain meds)          COGNITION  Following Commands:  follows all commands and directions without difficulty    RANGE OF MOTION AND STRENGTH ASSESSMENT  Upper extremity ROM and strength are within functional limits      Lower extremity ROM is within functional limits     Lower extremity strength is within functional limits       BALANCE  Static Sitting: Fair -  Dynamic Sitting: Fair -  Static Standing: Fair -  Dynamic Standing: Poor +    ADDITIONAL TESTS                                    ACTIVITY TOLERANCE           BP: 106/68 (seated EOB)  BP Location: Right arm  BP Method: Automatic  Patient Position: Sitting    O2 WALK       NEUROLOGICAL FINDINGS                        AM-PAC '6-Clicks' INPATIENT SHORT FORM - BASIC MOBILITY  How much difficulty does the patient currently have...  Patient Difficulty: Turning over in bed (including adjusting bedclothes, sheets and blankets)?: None   Patient Difficulty: Sitting down on and standing up from a chair with arms (e.g., wheelchair, bedside commode, etc.): A Little   Patient Difficulty: Moving from lying on back to sitting on the side of the bed?: A Little   How much help from another person does the patient currently need...   Help from Another: Moving to and from a bed to a chair (including a wheelchair)?: A Little   Help from Another: Need to walk in hospital room?: A Little   Help from Another: Climbing 3-5 steps with a railing?: A Little       AM-PAC Score:  Raw Score: 19   Approx Degree of Impairment: 41.77%   Standardized Score (AM-PAC Scale): 45.44   CMS  Modifier (G-Code): CK    FUNCTIONAL ABILITY STATUS  Gait Assessment   Functional Mobility/Gait Assessment  Gait Assistance: Minimum assistance  Distance (ft): 100  Assistive Device: Rolling walker    Skilled Therapy Provided     Bed Mobility:  Rolling: ind  Supine to sit: supervision   Sit to supine: NT     Transfer Mobility:  Sit to stand: CGA to initially no assistive device- reaching out for IV pole to hold onto.  Pt given RW to use at that time.    Stand to sit: CGA  Gait = see amb assessment above.    Therapist's Comments: Pt received shortly after pain medications administered.  Pt reports lightheadedness/LOB during duration of walking. Gait improved with use of RW.    Exercise/Education Provided:  Bed mobility  Body mechanics  Energy conservation  Gait training    Patient End of Session: Up in chair;Needs met;Call light within reach;RN aware of session/findings;Alarm set      Patient Evaluation Complexity Level:  History Moderate - 1 or 2 personal factors and/or co-morbidities   Examination of body systems Low - addressing 1-2 elements   Clinical Presentation Low - Stable   Clinical Decision Making Low - Stable       PT Session Time: 20 minutes    Therapeutic Activity: 8 minutes

## 2024-05-15 NOTE — CM/SW NOTE
Acknowledged SW order for SDOH, noted issues w/Transportation.  Attempted to meet w/pt but he was off the floor at MRI    Addendum 1600  Pt back in his room so attempted to meet w/him re: SDOH, but he requested to wait until tomorrow to discuss.  States he's had a busy day and too tired to discuss.    / to remain available for support and/or discharge planning.     Mitzi MARTELA MSN, RN CTL/  [id number]

## 2024-05-15 NOTE — PROGRESS NOTES
Problem:  Abdominal Pain     Data:  Alert and oriented. Dentures. Nikolai. Room air. Tele. Lovenox. Poor appetite. Urinal. Bed alarm. Up with assistance. IVF. PRN pain medication. NPO at midnight for EGD/colon tomorrow. Start bowel prep this afternoon. Clear liquids for now. MRI+MRCP done today, results pending. Need fecal sample if possible. GI following.     Action: Keep patient comfortable and continue to monitor every 2 hours.     Education: POC for the day.    Response: Verbalized understanding.

## 2024-05-15 NOTE — PLAN OF CARE
Problem: Diabetes/Glucose Control  Goal: Glucose maintained within prescribed range  Description: INTERVENTIONS:  - Monitor Blood Glucose as ordered  - Assess for signs and symptoms of hyperglycemia and hypoglycemia  - Administer ordered medications to maintain glucose within target range  - Assess barriers to adequate nutritional intake and initiate nutrition consult as needed  - Instruct patient on self management of diabetes  Outcome: Progressing     Problem: Patient/Family Goals  Goal: Patient/Family Long Term Goal  Description: Patient's Long Term Goal: Discharge home    Interventions:  - See additional Care Plan goals for specific interventions  Outcome: Progressing  Goal: Patient/Family Short Term Goal  Description: Patient's Short Term Goal:  5/15 noc: get rest     Interventions:   - cluster care  - See additional Care Plan goals for specific interventions  Outcome: Progressing

## 2024-05-16 ENCOUNTER — APPOINTMENT (OUTPATIENT)
Dept: CT IMAGING | Facility: HOSPITAL | Age: 59
DRG: 439 | End: 2024-05-16
Attending: INTERNAL MEDICINE

## 2024-05-16 ENCOUNTER — ANESTHESIA EVENT (OUTPATIENT)
Dept: ENDOSCOPY | Facility: HOSPITAL | Age: 59
DRG: 439 | End: 2024-05-16

## 2024-05-16 ENCOUNTER — ANESTHESIA (OUTPATIENT)
Dept: ENDOSCOPY | Facility: HOSPITAL | Age: 59
DRG: 439 | End: 2024-05-16

## 2024-05-16 LAB
ALBUMIN SERPL-MCNC: 2.8 G/DL (ref 3.4–5)
ALBUMIN/GLOB SERPL: 0.8 {RATIO} (ref 1–2)
ALP LIVER SERPL-CCNC: 179 U/L
ALT SERPL-CCNC: 44 U/L
ANION GAP SERPL CALC-SCNC: 7 MMOL/L (ref 0–18)
AST SERPL-CCNC: 100 U/L (ref 15–37)
BASOPHILS # BLD AUTO: 0.04 X10(3) UL (ref 0–0.2)
BASOPHILS NFR BLD AUTO: 0.9 %
BILIRUB SERPL-MCNC: 2.1 MG/DL (ref 0.1–2)
BUN BLD-MCNC: 3 MG/DL (ref 9–23)
CALCIUM BLD-MCNC: 8.3 MG/DL (ref 8.5–10.1)
CHLORIDE SERPL-SCNC: 94 MMOL/L (ref 98–112)
CO2 SERPL-SCNC: 33 MMOL/L (ref 21–32)
CREAT BLD-MCNC: 0.46 MG/DL
EGFRCR SERPLBLD CKD-EPI 2021: 119 ML/MIN/1.73M2 (ref 60–?)
EOSINOPHIL # BLD AUTO: 0.09 X10(3) UL (ref 0–0.7)
EOSINOPHIL NFR BLD AUTO: 2.1 %
ERYTHROCYTE [DISTWIDTH] IN BLOOD BY AUTOMATED COUNT: 12.2 %
GLOBULIN PLAS-MCNC: 3.3 G/DL (ref 2.8–4.4)
GLUCOSE BLD-MCNC: 102 MG/DL (ref 70–99)
GLUCOSE BLD-MCNC: 108 MG/DL (ref 70–99)
GLUCOSE BLD-MCNC: 110 MG/DL (ref 70–99)
GLUCOSE BLD-MCNC: 180 MG/DL (ref 70–99)
GLUCOSE BLD-MCNC: 96 MG/DL (ref 70–99)
HCT VFR BLD AUTO: 39.3 %
HGB BLD-MCNC: 14.2 G/DL
IMM GRANULOCYTES # BLD AUTO: 0.02 X10(3) UL (ref 0–1)
IMM GRANULOCYTES NFR BLD: 0.5 %
LYMPHOCYTES # BLD AUTO: 0.87 X10(3) UL (ref 1–4)
LYMPHOCYTES NFR BLD AUTO: 20.1 %
MCH RBC QN AUTO: 34.1 PG (ref 26–34)
MCHC RBC AUTO-ENTMCNC: 36.1 G/DL (ref 31–37)
MCV RBC AUTO: 94.5 FL
MONOCYTES # BLD AUTO: 0.5 X10(3) UL (ref 0.1–1)
MONOCYTES NFR BLD AUTO: 11.6 %
NEUTROPHILS # BLD AUTO: 2.8 X10 (3) UL (ref 1.5–7.7)
NEUTROPHILS # BLD AUTO: 2.8 X10(3) UL (ref 1.5–7.7)
NEUTROPHILS NFR BLD AUTO: 64.8 %
OSMOLALITY SERPL CALC.SUM OF ELEC: 275 MOSM/KG (ref 275–295)
PLATELET # BLD AUTO: 69 10(3)UL (ref 150–450)
PLATELETS.RETICULATED NFR BLD AUTO: 4.3 % (ref 0–7)
POTASSIUM SERPL-SCNC: 2.8 MMOL/L (ref 3.5–5.1)
PROT SERPL-MCNC: 6.1 G/DL (ref 6.4–8.2)
RBC # BLD AUTO: 4.16 X10(6)UL
SODIUM SERPL-SCNC: 134 MMOL/L (ref 136–145)
WBC # BLD AUTO: 4.3 X10(3) UL (ref 4–11)

## 2024-05-16 PROCEDURE — 88305 TISSUE EXAM BY PATHOLOGIST: CPT | Performed by: INTERNAL MEDICINE

## 2024-05-16 PROCEDURE — 0DB98ZX EXCISION OF DUODENUM, VIA NATURAL OR ARTIFICIAL OPENING ENDOSCOPIC, DIAGNOSTIC: ICD-10-PCS | Performed by: INTERNAL MEDICINE

## 2024-05-16 PROCEDURE — 0DBH8ZX EXCISION OF CECUM, VIA NATURAL OR ARTIFICIAL OPENING ENDOSCOPIC, DIAGNOSTIC: ICD-10-PCS | Performed by: INTERNAL MEDICINE

## 2024-05-16 PROCEDURE — 74174 CTA ABD&PLVS W/CONTRAST: CPT | Performed by: INTERNAL MEDICINE

## 2024-05-16 PROCEDURE — 80053 COMPREHEN METABOLIC PANEL: CPT | Performed by: HOSPITALIST

## 2024-05-16 PROCEDURE — 85025 COMPLETE CBC W/AUTO DIFF WBC: CPT | Performed by: HOSPITALIST

## 2024-05-16 PROCEDURE — S0028 INJECTION, FAMOTIDINE, 20 MG: HCPCS | Performed by: HOSPITALIST

## 2024-05-16 PROCEDURE — 0DB68ZX EXCISION OF STOMACH, VIA NATURAL OR ARTIFICIAL OPENING ENDOSCOPIC, DIAGNOSTIC: ICD-10-PCS | Performed by: INTERNAL MEDICINE

## 2024-05-16 PROCEDURE — 82962 GLUCOSE BLOOD TEST: CPT

## 2024-05-16 PROCEDURE — 0DBK8ZZ EXCISION OF ASCENDING COLON, VIA NATURAL OR ARTIFICIAL OPENING ENDOSCOPIC: ICD-10-PCS | Performed by: INTERNAL MEDICINE

## 2024-05-16 PROCEDURE — 0DB78ZX EXCISION OF STOMACH, PYLORUS, VIA NATURAL OR ARTIFICIAL OPENING ENDOSCOPIC, DIAGNOSTIC: ICD-10-PCS | Performed by: INTERNAL MEDICINE

## 2024-05-16 RX ORDER — HYDROMORPHONE HYDROCHLORIDE 1 MG/ML
0.1 INJECTION, SOLUTION INTRAMUSCULAR; INTRAVENOUS; SUBCUTANEOUS EVERY 2 HOUR PRN
Status: DISCONTINUED | OUTPATIENT
Start: 2024-05-16 | End: 2024-05-18

## 2024-05-16 RX ORDER — NORTRIPTYLINE HYDROCHLORIDE 10 MG/1
10 CAPSULE ORAL NIGHTLY
Status: DISCONTINUED | OUTPATIENT
Start: 2024-05-16 | End: 2024-05-19

## 2024-05-16 RX ORDER — SODIUM CHLORIDE, SODIUM LACTATE, POTASSIUM CHLORIDE, CALCIUM CHLORIDE 600; 310; 30; 20 MG/100ML; MG/100ML; MG/100ML; MG/100ML
INJECTION, SOLUTION INTRAVENOUS CONTINUOUS
Status: DISCONTINUED | OUTPATIENT
Start: 2024-05-16 | End: 2024-05-17

## 2024-05-16 RX ORDER — NALOXONE HYDROCHLORIDE 0.4 MG/ML
0.08 INJECTION, SOLUTION INTRAMUSCULAR; INTRAVENOUS; SUBCUTANEOUS ONCE AS NEEDED
Status: DISCONTINUED | OUTPATIENT
Start: 2024-05-16 | End: 2024-05-16 | Stop reason: HOSPADM

## 2024-05-16 RX ORDER — HYDROMORPHONE HYDROCHLORIDE 1 MG/ML
0.4 INJECTION, SOLUTION INTRAMUSCULAR; INTRAVENOUS; SUBCUTANEOUS EVERY 2 HOUR PRN
Status: DISCONTINUED | OUTPATIENT
Start: 2024-05-16 | End: 2024-05-18

## 2024-05-16 RX ORDER — LIDOCAINE HYDROCHLORIDE 10 MG/ML
INJECTION, SOLUTION EPIDURAL; INFILTRATION; INTRACAUDAL; PERINEURAL AS NEEDED
Status: DISCONTINUED | OUTPATIENT
Start: 2024-05-16 | End: 2024-05-16 | Stop reason: SURG

## 2024-05-16 RX ORDER — PREGABALIN 25 MG/1
50 CAPSULE ORAL 3 TIMES DAILY
Status: DISCONTINUED | OUTPATIENT
Start: 2024-05-16 | End: 2024-05-19

## 2024-05-16 RX ORDER — POTASSIUM CHLORIDE 14.9 MG/ML
20 INJECTION INTRAVENOUS ONCE
Qty: 100 ML | Refills: 0 | Status: COMPLETED | OUTPATIENT
Start: 2024-05-16 | End: 2024-05-16

## 2024-05-16 RX ORDER — SODIUM CHLORIDE, SODIUM LACTATE, POTASSIUM CHLORIDE, CALCIUM CHLORIDE 600; 310; 30; 20 MG/100ML; MG/100ML; MG/100ML; MG/100ML
INJECTION, SOLUTION INTRAVENOUS CONTINUOUS PRN
Status: DISCONTINUED | OUTPATIENT
Start: 2024-05-16 | End: 2024-05-16 | Stop reason: SURG

## 2024-05-16 RX ORDER — HYDROMORPHONE HYDROCHLORIDE 1 MG/ML
0.2 INJECTION, SOLUTION INTRAMUSCULAR; INTRAVENOUS; SUBCUTANEOUS EVERY 2 HOUR PRN
Status: DISCONTINUED | OUTPATIENT
Start: 2024-05-16 | End: 2024-05-18

## 2024-05-16 RX ADMIN — LIDOCAINE HYDROCHLORIDE 100 MG: 10 INJECTION, SOLUTION EPIDURAL; INFILTRATION; INTRACAUDAL; PERINEURAL at 15:29:00

## 2024-05-16 RX ADMIN — SODIUM CHLORIDE, SODIUM LACTATE, POTASSIUM CHLORIDE, CALCIUM CHLORIDE: 600; 310; 30; 20 INJECTION, SOLUTION INTRAVENOUS at 15:32:00

## 2024-05-16 RX ADMIN — SODIUM CHLORIDE, SODIUM LACTATE, POTASSIUM CHLORIDE, CALCIUM CHLORIDE: 600; 310; 30; 20 INJECTION, SOLUTION INTRAVENOUS at 15:25:00

## 2024-05-16 NOTE — PLAN OF CARE
Pt Aox4. Spo2 >90% on RA. NSR on tele, refusing tele at this time. Lovenox. Golytely prep, NPO at midnight for scope. Abd pain, prn meds w relief. upSBA. QID accucheck. IVF. Pt updated on POC. Call light within reach, safety precautions in place. No further pt needs at this time.     Problem: Diabetes/Glucose Control  Goal: Glucose maintained within prescribed range  Description: INTERVENTIONS:  - Monitor Blood Glucose as ordered  - Assess for signs and symptoms of hyperglycemia and hypoglycemia  - Administer ordered medications to maintain glucose within target range  - Assess barriers to adequate nutritional intake and initiate nutrition consult as needed  - Instruct patient on self management of diabetes  Outcome: Progressing     Problem: Patient/Family Goals  Goal: Patient/Family Long Term Goal  Description: Patient's Long Term Goal: Discharge home    Interventions:  - See additional Care Plan goals for specific interventions  Outcome: Progressing  Goal: Patient/Family Short Term Goal  Description: Patient's Short Term Goal:  5/15 noc: get rest     Interventions:   - cluster care  - See additional Care Plan goals for specific interventions  Outcome: Progressing

## 2024-05-16 NOTE — PLAN OF CARE
Pt AOx4. VSS on RA. Refusing tele. Refusing VTE. Voids. C/o abdominal pain, see MAR. SBA. Ivf. Npo for EGD/colonoscopy. And CT abd/pelvis. QID accu checks. Updated on poc.       Problem: Diabetes/Glucose Control  Goal: Glucose maintained within prescribed range  Description: INTERVENTIONS:  - Monitor Blood Glucose as ordered  - Assess for signs and symptoms of hyperglycemia and hypoglycemia  - Administer ordered medications to maintain glucose within target range  - Assess barriers to adequate nutritional intake and initiate nutrition consult as needed  - Instruct patient on self management of diabetes  Outcome: Progressing     Problem: Patient/Family Goals  Goal: Patient/Family Long Term Goal  Description: Patient's Long Term Goal: Discharge home    Interventions:  - See additional Care Plan goals for specific interventions  Outcome: Progressing  Goal: Patient/Family Short Term Goal  Description: Patient's Short Term Goal:  5/15 noc: get rest   5/16 AM: manage pain  Interventions:   - cluster care  - See additional Care Plan goals for specific interventions  Outcome: Progressing

## 2024-05-16 NOTE — OCCUPATIONAL THERAPY NOTE
OT orders received and chart reviewed. Pt declines OT at this time and reports no acute concerns with ADLs and functional transfers. OT will sign off at this time. Please re-order should pt experience a decline in functional status. Thank you.

## 2024-05-16 NOTE — H&P
History and Physical for Endoscopic Procedure      Cirilo Reyes Patient Status:  Inpatient    3/7/1965 MRN [id number]   Formerly [facility] 5NW-A Attending Ryan Betts MD   Hosp Day # 1 PCP Wil Wahl MD     Date of Consult:  2024     Reason for Consultation:  Weight loss, esophageal dysphagia    History of Present Illness:  Cirilo Reyes is a a(n) 59 year old male.     History:  Past Medical History:    Diabetes (HCC)    Esophageal reflux    Hearing impairment    [place]    Neuropathy    Problems with swallowing    Visual impairment     Past Surgical History:   Procedure Laterality Date    Carpal tunnel release Right     Colonoscopy      Fracture surgery Right     wrist    Other accessory Left     leg    Other surgical history      jaw    Other surgical history Left     elbow    Other surgical history Left     leg    Upper gi endoscopy,exam       History reviewed. No pertinent family history.   reports that he has quit smoking. His smoking use included cigarettes. He has never used smokeless tobacco. He reports that he does not currently use alcohol after a past usage of about 2.0 standard drinks of alcohol per week. He reports that he does not use drugs.    Allergies:  No Known Allergies    Medications:    Current Facility-Administered Medications:     HYDROmorphone (Dilaudid) 1 MG/ML injection 0.1 mg, 0.1 mg, Intravenous, Q2H PRN **OR** HYDROmorphone (Dilaudid) 1 MG/ML injection 0.2 mg, 0.2 mg, Intravenous, Q2H PRN **OR** HYDROmorphone (Dilaudid) 1 MG/ML injection 0.4 mg, 0.4 mg, Intravenous, Q2H PRN    potassium chloride 40 mEq in 250mL sodium chloride 0.9% IVPB premix, 40 mEq, Intravenous, Once **FOLLOWED BY** potassium chloride 20 mEq/100mL IVPB premix 20 mEq, 20 mEq, Intravenous, Once    nortriptyline (Pamelor) cap 10 mg, 10 mg, Oral, Nightly    pregabalin (Lyrica) cap 50 mg, 50 mg, Oral, TID    sodium chloride 0.9% infusion, , Intravenous, Continuous    enoxaparin (Lovenox) 40 MG/0.4ML SUBQ  injection 40 mg, 40 mg, Subcutaneous, Daily    polyethylene glycol (PEG 3350) (Miralax) 17 g oral packet 17 g, 17 g, Oral, Daily PRN    sennosides (Senokot) tab 17.2 mg, 17.2 mg, Oral, Nightly PRN    bisacodyl (Dulcolax) 10 MG rectal suppository 10 mg, 10 mg, Rectal, Daily PRN    fleet enema (Fleet) 7-19 GM/118ML rectal enema 133 mL, 1 enema, Rectal, Once PRN    ondansetron (Zofran) 4 MG/2ML injection 4 mg, 4 mg, Intravenous, Q6H PRN    prochlorperazine (Compazine) 10 MG/2ML injection 5 mg, 5 mg, Intravenous, Q8H PRN    melatonin tab 3 mg, 3 mg, Oral, Nightly PRN    acetaminophen (Tylenol) tab 650 mg, 650 mg, Oral, Q4H PRN **OR** HYDROcodone-acetaminophen (Norco) 5-325 MG per tab 1 tablet, 1 tablet, Oral, Q4H PRN **OR** HYDROcodone-acetaminophen (Norco) 5-325 MG per tab 2 tablet, 2 tablet, Oral, Q4H PRN    acetaminophen (Tylenol Extra Strength) tab 500 mg, 500 mg, Oral, Q4H PRN    escitalopram (Lexapro) tab 20 mg, 20 mg, Oral, Daily    famotidine (Pepcid) 20 mg/2mL injection 20 mg, 20 mg, Intravenous, BID    artificial saliva substitute (Biotene) oral solution, , Oral, PRN    multivitamin with minerals (Thera M Plus) tab 1 tablet, 1 tablet, Oral, Daily    glucose (Dex4) 15 GM/59ML oral liquid 15 g, 15 g, Oral, Q15 Min PRN **OR** glucose (Glutose) 40% oral gel 15 g, 15 g, Oral, Q15 Min PRN **OR** glucose-vitamin C (Dex-4) chewable tab 4 tablet, 4 tablet, Oral, Q15 Min PRN **OR** dextrose 50% injection 50 mL, 50 mL, Intravenous, Q15 Min PRN **OR** glucose (Dex4) 15 GM/59ML oral liquid 30 g, 30 g, Oral, Q15 Min PRN **OR** glucose (Glutose) 40% oral gel 30 g, 30 g, Oral, Q15 Min PRN **OR** glucose-vitamin C (Dex-4) chewable tab 8 tablet, 8 tablet, Oral, Q15 Min PRN    insulin aspart (NovoLOG) 100 Units/mL FlexPen 1-5 Units, 1-5 Units, Subcutaneous, TID AC and HS    Review of Systems:  Gastrointestinal: negative other than specified in the HPI  General: negative other than specified in the HPI  Neurological: negative  other than specified in the HPI  Cardiovascular: negative other than specified in the HPI  Respiratory: negative other than specified in the HPI    Physical Exam:  No acute distress  RRR  CTA B/L  SOFT +BS    Assessment/Plan:  Patient Active Problem List   Diagnosis    Abdominal pain, generalized    Hypokalemia    Dehydration    Anorexia    ECG abnormal       EGD+COL today    Gabriele Luis MD  5/16/2024  12:47 PM

## 2024-05-16 NOTE — DISCHARGE INSTRUCTIONS
STOP nortriptyline and return to the hospital for evaluation if you develop any symptoms of serotonin syndrome (description is attached).     Sometimes managing your health at home requires assistance.  The [facility] team has recognized your preference to use [facility].  They can be reached by phone at [phone].  The fax number for your reference is [phone].. A representative from the home health agency will contact you or your family to schedule your first visit.        Transport resources:   -[employer] [phone] [email or web address]  -[facility] Transportation [phone] Ext. [phone]   -[facility] [employer] Dial-[employer]-R[employer] Service  Reservations: [phone]  -[facility] Senior Shuttle Bus Line at [phone]  -[employer] [phone] [email or web address]  -[employer] [phone]  -[employer] [phone]  -[employer] [phone]  -[employer] [phone]   -[employer]: [phone]  -[facility] mobile [place] [phone]  -[employer] [phone]  -[employer] [facility], [place], [facility], and [facility]. [phone] [email or web address]  -[employer] [facility] and surrounding communities [phone] [email or web address]

## 2024-05-16 NOTE — PAYOR COMM NOTE
5/15 INFO   NO NOTES FOR 5/16 AT THIS TIME  ADMISSION REVIEW     Payor: [facility]  Subscriber #:  [id number]  Authorization Number: [id number]    Admit date: 5/15/24  Admit time: 12:53 AM       REVIEW DOCUMENTATION:     ED Provider Notes        ED Provider Notes signed by Cirilo Trinidad MD at 5/14/2024 10:17 PM       Author: Cirilo Trinidad MD Service: -- Author Type: Physician    Filed: 5/14/2024 10:17 PM Date of Service: 5/14/2024  5:26 PM Status: Signed    : Cirilo Trinidad MD (Physician)           Patient Seen in: [facility] Emergency Department      History     Chief Complaint   Patient presents with    Abdomen/Flank Pain     Stated Complaint: sent for admit for his stomach issues    Subjective:     HPI    59-year-old male, with diabetes (glyburide, insulin) reports unintentional weight loss over the past 6 months, from 200 lbs to 145 lbs. Reports loss of appetite and inability to eat. Reports occasional vomiting, with no specific pattern. Reports abdominal pain that is diffuse.  He had exploratory surgery in the groin area years ago due to a mass, which was deemed non-threatening.  CT scan done 11/19/2023 showed abnormal appearance of the pancreas thought due to pancreatitis and diabetes.  Also there was circumferential thickening of the distal esophagus with paraesophageal lymph nodes thought possibly related to chronic reflux.      Objective:   Past Medical History:    Diabetes (HCC)    Esophageal reflux    Hearing impairment    Tejon    Neuropathy    Problems with swallowing    Visual impairment              Past Surgical History:   Procedure Laterality Date    Carpal tunnel release Right     Colonoscopy      Fracture surgery Right     wrist    Other accessory Left     leg    Other surgical history      jaw    Other surgical history Left     elbow    Other surgical history Left     leg    Upper gi endoscopy,exam                  Social History     Socioeconomic History    Marital status:     Tobacco Use    Smoking status: Former     Current packs/day: 1.00     Types: Cigarettes    Smokeless tobacco: Never   Vaping Use    Vaping status: Never Used   Substance and Sexual Activity    Alcohol use: Not Currently     Alcohol/week: 2.0 standard drinks of alcohol     Types: 2 Cans of beer per week    Drug use: Never              Review of Systems    Positive for stated complaint: sent for admit for his stomach issues  Other systems are as noted in HPI.  Constitutional and vital signs reviewed.      All other systems reviewed and negative except as noted above.    Physical Exam     ED Triage Vitals [05/14/24 1346]   /74   Pulse (!) 16   Resp 16   Temp 98.3 °F (36.8 °C)   Temp src Temporal   SpO2 98 %   O2 Device None (Room air)       Current:/78   Pulse 72   Temp 98.3 °F (36.8 °C) (Temporal)   Resp 16   Ht 182.9 cm (6')   Wt 65.8 kg   SpO2 95%   BMI 19.67 kg/m²       General:  Vitals as listed.  No acute distress   HEENT: Sclerae anicteric.  Conjunctivae show no pallor.  Oropharynx clear, mucous membranes moist   Lungs: good air exchange and clear   Heart: regular rate rhythm and no murmur   Abdomen: Diffusely tender with some guarding.  No hepatomegaly or splenomegaly appreciated.    Extremities: no edema, normal peripheral pulses   Neuro: Alert oriented and nonfocal      ED Course     Labs Reviewed   COMP METABOLIC PANEL (14) - Abnormal; Notable for the following components:       Result Value    Glucose 248 (*)     Sodium 131 (*)     Potassium 3.1 (*)     Chloride 84 (*)     CO2 35.0 (*)     BUN 6 (*)      (*)     ALT 66 (*)     Alkaline Phosphatase 262 (*)     Bilirubin, Total 3.2 (*)     Albumin 3.3 (*)     A/G Ratio 0.8 (*)     All other components within normal limits   MAGNESIUM - Abnormal; Notable for the following components:    Magnesium 1.5 (*)     All other components within normal limits   URINALYSIS, ROUTINE - Abnormal; Notable for the following components:     Spec Gravity >1.030 (*)     Glucose Urine 30 (*)     Ketones Urine 10 (*)     Protein Urine 20 (*)     Urobilinogen Urine 2 (*)     All other components within normal limits   CBC W/ DIFFERENTIAL - Abnormal; Notable for the following components:    .0 (*)     All other components within normal limits   LIPASE - Normal   CBC WITH DIFFERENTIAL WITH PLATELET    Narrative:     The following orders were created for panel order CBC With Differential With Platelet.  Procedure                               Abnormality         Status                     ---------                               -----------         ------                     CBC W/ DIFFERENTIAL[[id number]]          Abnormal            Final result                 Please view results for these tests on the individual orders.   RAINBOW DRAW LAVENDER   RAINBOW DRAW LIGHT GREEN   RAINBOW DRAW BLUE   RAINBOW DRAW GOLD     CT ABDOMEN+PELVIS(CONTRAST ONLY)(CPT=74177)    Result Date: 5/14/2024  CONCLUSION:  1. Diffuse decreased attenuation in liver parenchyma is consistent with hepatic steatosis. 2. Diffuse marked atrophy of pancreatic parenchyma is noted. 3. There is no other acute abnormality detected.    LOCATION:  [facility]   Dictated by (CST): Gagan Ritter MD on 5/14/2024 at 7:08 PM     Finalized by (CST): Gagan Ritter MD on 5/14/2024 at 7:12 PM       XR CHEST AP PORTABLE  (CPT=71045)    Result Date: 5/14/2024  CONCLUSION:  No acute cardiopulmonary abnormality identified.  No free air seen beneath the diaphragm.   LOCATION:  [facility]      Dictated by (CST): Ollie Hinton MD on 5/14/2024 at 6:53 PM     Finalized by (CST): Ollie Hinton MD on 5/14/2024 at 6:54 PM      I independently read the radiographs and chest x-ray shows no infiltrate  EKG    Rate, intervals and axes as noted on EKG Report.  Rate: 73  Rhythm: Sinus Rhythm  Reading: Anterolateral T wave inversions.  Description of prior EKG on 12/2/2020 also showed anterolateral ischemic changes.   Unable to see the EKG other than the reading.    ED COURSE and MDM         Differential diagnosis before testing included pancreatitis versus perforated viscus, a medical condition that poses a threat to life.    I reviewed prior external notes including CT scan done at [facility] on 11/19/2023.  Results listed above.    Given Dilaudid for pain control.    Dr. Meza, [facility] hospitalist, notified.  Consult order placed for gastroenterology.    I have discussed with the patient the results of testing, differential diagnosis, and treatment plan. They expressed clear understanding of these instructions and agrees to the plan provided.    Disposition and Plan     Clinical Impression:  1. Abdominal pain, generalized    2. Hypokalemia    3. Dehydration    4. Anorexia    5. ECG abnormal         Disposition:  Admit  5/14/2024  9:42 pm    Follow-up:  No follow-up provider specified.      Medications Prescribed:  Current Discharge Medication List        START taking these medications    Details   potassium chloride 20 MEQ Oral Tab CR Take 1 tablet (20 mEq total) by mouth 2 (two) times daily for 10 days.  Qty: 20 tablet, Refills: 0             Signed by Cirilo Trinidad MD on 5/14/2024 10:17 PM         MEDICATIONS ADMINISTERED IN LAST 1 DAY:  famotidine (Pepcid) 20 mg/2mL injection 20 mg       Date Action Dose Route User    5/16/2024 0844 Given 20 mg Intravenous Kymberly Meng RN    5/15/2024 2004 Given 20 mg Intravenous Kinsey Osborne RN          gadoterate meglumine (Dotarem) 7.5 MMOL/15ML injection 15 mL       Date Action Dose Route User    5/15/2024 1520 Given 12 mL Intravenous (Right Lower Arm) Soledad Rae          HYDROmorphone (Dilaudid) 1 MG/ML injection 0.4 mg       Date Action Dose Route User    5/16/2024 1418 Given 0.4 mg Intravenous Nelia Wilde, RN    5/16/2024 1131 Given 0.4 mg Intravenous Nelia Wilde, RN          morphINE PF 2 MG/ML injection 2 mg       Date Action Dose Route User    5/15/2024  2004 Given 2 mg Intravenous Kinsey Osborne RN          morphINE PF 4 MG/ML injection 4 mg       Date Action Dose Route User    5/16/2024 0844 Given 4 mg Intravenous Kymberly Meng RN    5/16/2024 0521 Given 4 mg Intravenous Kinsey Osborne RN    5/16/2024 0000 Given 4 mg Intravenous Kinsey Osborne RN    5/15/2024 1558 Given 4 mg Intravenous Teresa Wilson RN          ondansetron (Zofran) 4 MG/2ML injection 4 mg       Date Action Dose Route User    5/15/2024 1742 Given 4 mg Intravenous Teresa Wilson RN          polyethylene glycol-electrolyte (Golytely) 236 g oral solution 4,000 mL       Date Action Dose Route User    5/15/2024 1602 Given 4,000 mL Oral Teresa Wilson RN          potassium chloride 40 mEq in 250mL sodium chloride 0.9% IVPB premix       Date Action Dose Route User    5/16/2024 1131 New Bag 40 mEq Intravenous Nelia Wilde RN            Vitals (last day)       Date/Time Temp Pulse Resp BP SpO2 Weight O2 Device O2 Flow Rate (L/min) [facility]    05/16/24 1212 98.4 °F (36.9 °C) 66 16 120/84 -- -- -- -- AD    05/16/24 0800 98.2 °F (36.8 °C) -- 15 115/84 -- -- -- --     05/16/24 0506 98.3 °F (36.8 °C) 62 16 122/86 96 % -- None (Room air) 0 L/min MA    05/15/24 2353 98.8 °F (37.1 °C) 66 16 117/73 97 % -- None (Room air) 0 L/min MA    05/15/24 1943 97.8 °F (36.6 °C) 65 16 119/88 97 % -- None (Room air) 0 L/min MA    05/15/24 1630 97.4 °F (36.3 °C) 66 14 111/81 -- -- None (Room air) --     05/15/24 1130 98.8 °F (37.1 °C) 68 14 109/74 97 % -- None (Room air) -- TL    05/15/24 0949 -- -- -- 106/68 -- -- -- -- AE    05/15/24 0800 98.2 °F (36.8 °C) 76 24 99/73 98 % -- None (Room air) -- TL    05/15/24 0500 98.3 °F (36.8 °C) 68 19 101/75 98 % -- None (Room air) 0 L/min MA    05/15/24 0106 -- -- -- -- -- 145 lb -- -- CM    05/15/24 0058 98.3 °F (36.8 °C) -- 18 -- 100 % -- None (Room air) -- CM       5/15 H&P  Chief Complaint   Patient presents with    Abdomen/Flank Pain         History of Present Illness:  Cirilo Reyes is a 59 year old male with DM2 with neuropathy, TCP who presented for evaluation of 6 monthos of progressive anorexia, early satiety, occasional vomiting and diffuse abdominal pain. He reported the feeling of foot getting \"stuck in his esophagus.\" Per ED report he has gone from 200 lbs to 145 lbs. (Weighed 156 lbs at pcp visit 2/2024). Reports history alcoholism in the past.      He was seen at his PCP and directed to the ED for urgent evaluation after OP labs showed hyponatremia & hypokalemia.     On arrival he was afebrile, HDS, 98% on room air. Labs notable for Na 131, K 3.1, , , ALT 66, t-Bili 3.2. CBC unremarkable. Lipase normal. CXR normal. CT Abd/Pelvis w/ hepatic steatosis, diffuse marked atrophy of pancreatic parenchyma.He was given iv dilaudid and GI consult order placed. Overnight there were no acute events and he remained afebrile and hemodynamically stable.      This morning he continues to endorse generalized abdominal tenderness, worst in the right upper quadrant, but denies any other new or worsening symptoms. He reports dysphagia symptoms are chronic, and has had EGD with esophageal dilation in the past with Dr. Grossman, but the anorexia/early satiety and weight loss are new.       Physical Exam:    /75 (BP Location: Left arm)   Pulse 68   Temp 98.3 °F (36.8 °C) (Oral)   Resp 19   Ht 6' (1.829 m)   Wt 145 lb (65.8 kg)   SpO2 98%   BMI 19.67 kg/m²       ASSESSMENT / PLAN:      Cirilo Reyes Is a a 59 year old male who presents with dysphagia, progressive anorexia and severe (~25%) weight loss, found to have LFT elevation & atrophic pancreas on imaging      Problem List / Diagnoses     Dysphagia  History Esophageal Stricture  Anorexia  Weight Loss   Early Satiety   LFT Elevation / Pancreatic Atrophy   HERBERT (generalized anxiety disorder)  Major depression, recurrent, chronic (HCC)  Type 2 diabetes mellitus with hyperglycemia, without long-term current use of  insulin (HCC)     Plan     Dysphagia  History Esophageal Stricture  Perera's  -- reports history esophageal stricture s/p dilation with temporary relief  -- follows with GI as outpatient, s/p EGD 3/2024 here with findings of 9cm circumferential Perera's Esophagus, multiple rings in proximal esophagus s/p dilation   -- GI consulted, plan discussed  -- would likely benefit from EGD/Colonoscopy this admission   -- iv famotidine BID while NPO      Anorexia  Weight Loss   Early Satiety   -- suspect secondary to chronic pancreatitis (below)  -- nutrition consult      LFT Elevation / Pancreatic Atrophy   Chronic Pancreatitis   -- Outpatient CT 11/2023 showed \"Abnormal appearance of the pancreas with focal atrophy of the mid pancreatic body and question of   tiny pancreatic tail hypodensities. This may relate to sequela of pancreatitis and diabetes,\"   -- MRI Pancreas recommended but never completed  -- GI following per above, plan discussed   -- Check MRCP   -- ok for clear liquid diet following MRCP     HERBERT (generalized anxiety disorder)  Major depression, recurrent, chronic (HCC)  -- holding home meds for now, resume lexapro as able     Type 2 diabetes mellitus with hyperglycemia, without long-term current use of insulin (HCC)  -- accuchecks, SSI-low      5/15 GI CONSULT NOTE  Reason for Consultation: wt loss, esophageal dysphagia     HPI: 59M known to [facility] team, recently seen by Dr. Montes in outpt GI Clinic 4/2024 (~3wks ago) + prior EGD/EUS during admission 3/2024. Notes reviewed, d/w'd pt at length today.          EXAM:   /75 (BP Location: Left arm)   Pulse 68   Temp 98.3 °F (36.8 °C) (Oral)   Resp 19   Ht 6' (1.829 m)   Wt 145 lb (65.8 kg)   SpO2 98%   BMI 19.67 kg/m²  Body mass index is 19.67 kg/m².      ASSESSMENT AND PLAN:   #H/o prior acute pancreatitis, imaging features suggestive of pancreatic atrophy (likely chronic per EUS 3/2024)  #Perera's Esophagus  #Esophageal dysphagia, complicated  strictures noted on EGD 3/2024  #Chronic tobacco dependence (advised to quit)  #H/o alcohol use (not currently)  #Family history of pancreatic CA     Likely a chronic problem here, but will exclude urgent/acute issues this admission.  Advised pt that further evaluation + mgmt may need to take place as outpt w/ ongoing GI + PCP f/u. Care boundaries + expectations reviewed.  Obtain Contrast MRI/MRCP to reassess HPB anatomy, r/o interval lesion development given persistent wt loss since EUS 3/2024.  Check Fecal Elastase + Qual Fecal Fat now.  Start PERT pending findings. Pt was unable to start these as outpt d/t cost.  Move forward w/ repeat diag MAC EGD +/- dilation and COL tomorrow given ongoing wt loss + esophageal dysphagia.  May need re-dilation of the complex stricturing noted on 3/2024 EGD + EUS.  Expedite planned COL (scheduled in June), can do this admission as well.  Known Perera's Esophagus per 3/2024 EGD bx.  Continue PPI + daily MVI, strongly advise tob cessation.  Continue etoh abstinence as well.  Strongly recommend continued etoh abstinence + quitting tobacco use.  Counseled on impacts on HPB function over time, may accelerate loss of function + CA risk.  May need to undergo Pulm evaluation as outpt to evaluate for underlying COPD, particularly as this could incr catabolism d/t energy demands and also contribute to chronic/ongoing wt loss.  Will continue to follow w/ you.  Discussed w/ Hospitalist + RN today.  Discussed w/ pt + friend at bedside today.

## 2024-05-16 NOTE — ANESTHESIA PREPROCEDURE EVALUATION
PRE-OP EVALUATION    Patient Name: Cirilo Reyes    Admit Diagnosis: Anorexia [R63.0]  Abdominal pain, generalized [R10.84]  Dehydration [E86.0]  Hypokalemia [E87.6]  ECG abnormal [R94.31]    Pre-op Diagnosis: INPATIENT    ESOPHAGOGASTRODUODENOSCOPY (EGD), COLONOSCOPY    Anesthesia Procedure: ESOPHAGOGASTRODUODENOSCOPY (EGD), COLONOSCOPY  COLONOSCOPY    Surgeons and Role:     * Gabriele Luis MD - Primary    Pre-op vitals reviewed.  Temp: 98.4 °F (36.9 °C)  Pulse: 66  Resp: 16  BP: 120/84  SpO2: 96 %  Body mass index is 19.67 kg/m².    Current medications reviewed.  Hospital Medications:   HYDROmorphone (Dilaudid) 1 MG/ML injection 0.1 mg  0.1 mg Intravenous Q2H PRN    Or    HYDROmorphone (Dilaudid) 1 MG/ML injection 0.2 mg  0.2 mg Intravenous Q2H PRN    Or    HYDROmorphone (Dilaudid) 1 MG/ML injection 0.4 mg  0.4 mg Intravenous Q2H PRN    potassium chloride 40 mEq in 250mL sodium chloride 0.9% IVPB premix  40 mEq Intravenous Once    Followed by    potassium chloride 20 mEq/100mL IVPB premix 20 mEq  20 mEq Intravenous Once    nortriptyline (Pamelor) cap 10 mg  10 mg Oral Nightly    pregabalin (Lyrica) cap 50 mg  50 mg Oral TID    [] HYDROmorphone (Dilaudid) 1 MG/ML injection 0.5 mg  0.5 mg Intravenous Q30 Min PRN    [] sodium chloride 0.9% infusion   Intravenous Continuous    sodium chloride 0.9% infusion   Intravenous Continuous    enoxaparin (Lovenox) 40 MG/0.4ML SUBQ injection 40 mg  40 mg Subcutaneous Daily    polyethylene glycol (PEG 3350) (Miralax) 17 g oral packet 17 g  17 g Oral Daily PRN    sennosides (Senokot) tab 17.2 mg  17.2 mg Oral Nightly PRN    bisacodyl (Dulcolax) 10 MG rectal suppository 10 mg  10 mg Rectal Daily PRN    fleet enema (Fleet) 7-19 GM/118ML rectal enema 133 mL  1 enema Rectal Once PRN    ondansetron (Zofran) 4 MG/2ML injection 4 mg  4 mg Intravenous Q6H PRN    prochlorperazine (Compazine) 10 MG/2ML injection 5 mg  5 mg Intravenous Q8H PRN    melatonin tab 3 mg  3 mg Oral  Nightly PRN    acetaminophen (Tylenol) tab 650 mg  650 mg Oral Q4H PRN    Or    HYDROcodone-acetaminophen (Norco) 5-325 MG per tab 1 tablet  1 tablet Oral Q4H PRN    Or    HYDROcodone-acetaminophen (Norco) 5-325 MG per tab 2 tablet  2 tablet Oral Q4H PRN    acetaminophen (Tylenol Extra Strength) tab 500 mg  500 mg Oral Q4H PRN    escitalopram (Lexapro) tab 20 mg  20 mg Oral Daily    famotidine (Pepcid) 20 mg/2mL injection 20 mg  20 mg Intravenous BID    artificial saliva substitute (Biotene) oral solution   Oral PRN    [COMPLETED] polyethylene glycol-electrolyte (Golytely) 236 g oral solution 4,000 mL  4,000 mL Oral Once    multivitamin with minerals (Thera M Plus) tab 1 tablet  1 tablet Oral Daily    glucose (Dex4) 15 GM/59ML oral liquid 15 g  15 g Oral Q15 Min PRN    Or    glucose (Glutose) 40% oral gel 15 g  15 g Oral Q15 Min PRN    Or    glucose-vitamin C (Dex-4) chewable tab 4 tablet  4 tablet Oral Q15 Min PRN    Or    dextrose 50% injection 50 mL  50 mL Intravenous Q15 Min PRN    Or    glucose (Dex4) 15 GM/59ML oral liquid 30 g  30 g Oral Q15 Min PRN    Or    glucose (Glutose) 40% oral gel 30 g  30 g Oral Q15 Min PRN    Or    glucose-vitamin C (Dex-4) chewable tab 8 tablet  8 tablet Oral Q15 Min PRN    insulin aspart (NovoLOG) 100 Units/mL FlexPen 1-5 Units  1-5 Units Subcutaneous TID AC and HS    [COMPLETED] gadoterate meglumine (Dotarem) 7.5 MMOL/15ML injection 15 mL  15 mL Intravenous ONCE PRN    [COMPLETED] sodium chloride 0.9 % IV bolus 1,000 mL  1,000 mL Intravenous Once    [COMPLETED] ondansetron (Zofran) 4 MG/2ML injection 4 mg  4 mg Intravenous Once    [COMPLETED] iopamidol 76% (ISOVUE-370) injection for power injector  80 mL Intravenous ONCE PRN    [COMPLETED] potassium chloride (Klor-Con M20) tab 40 mEq  40 mEq Oral Once       Outpatient Medications:     Medications Prior to Admission   Medication Sig Dispense Refill Last Dose    potassium chloride 20 MEQ Oral Powd Pack Take 20 mEq by mouth 2 (two)  times daily.   5/14/2024    furosemide 20 MG Oral Tab Take 1 tablet (20 mg total) by mouth daily.   5/14/2024    insulin glargine (LANTUS SOLOSTAR) 100 UNIT/ML Subcutaneous Solution Pen-injector    5/13/2024    metFORMIN HCl ER, OSM, 500 MG (OSM) Oral Tablet 24 Hr Take 1 tablet (500 mg total) by mouth daily with breakfast.   5/14/2024    escitalopram 10 MG Oral Tab Take 1 tablet (10 mg total) by mouth daily.   5/14/2024    glyBURIDE 2.5 MG Oral Tab Take 2 mg by mouth daily with breakfast. (Patient not taking: Reported on 5/14/2024)   Not Taking    pantoprazole 40 MG Oral Tab EC Take 1 tablet (40 mg total) by mouth every morning before breakfast.   More than a month    gabapentin 300 MG Oral Cap Take 1 capsule (300 mg total) by mouth 3 (three) times daily. (Patient not taking: Reported on 5/14/2024)   Not Taking       Allergies: Patient has no known allergies.      Anesthesia Evaluation    Patient summary reviewed.    Anesthetic Complications  (-) history of anesthetic complications         GI/Hepatic/Renal      (+) GERD                           Cardiovascular    Negative cardiovascular ROS.    Exercise tolerance: good     MET: >4                                           Endo/Other      (+) diabetes                            Pulmonary    Negative pulmonary ROS.             (-) recent URI          Neuro/Psych                 (+) neuromuscular disease                     Past Surgical History:   Procedure Laterality Date    Carpal tunnel release Right     Colonoscopy      Fracture surgery Right     wrist    Other accessory Left     leg    Other surgical history      jaw    Other surgical history Left     elbow    Other surgical history Left     leg    Upper gi endoscopy,exam       Social History     Socioeconomic History    Marital status:    Tobacco Use    Smoking status: Former     Current packs/day: 1.00     Types: Cigarettes    Smokeless tobacco: Never   Vaping Use    Vaping status: Never Used    Substance and Sexual Activity    Alcohol use: Not Currently     Alcohol/week: 2.0 standard drinks of alcohol     Types: 2 Cans of beer per week    Drug use: Never     History   Drug Use Unknown     Available pre-op labs reviewed.  Lab Results   Component Value Date    WBC 4.3 05/16/2024    RBC 4.16 (L) 05/16/2024    HGB 14.2 05/16/2024    HCT 39.3 05/16/2024    MCV 94.5 05/16/2024    MCH 34.1 (H) 05/16/2024    MCHC 36.1 05/16/2024    RDW 12.2 05/16/2024    PLT 69.0 (L) 05/16/2024     Lab Results   Component Value Date     (L) 05/16/2024    K 2.8 (LL) 05/16/2024    CL 94 (L) 05/16/2024    CO2 33.0 (H) 05/16/2024    BUN 3 (L) 05/16/2024    CREATSERUM 0.46 (L) 05/16/2024     (H) 05/16/2024    CA 8.3 (L) 05/16/2024            Airway      Mallampati: II    TM distance: > 6 cm  Neck ROM: full Cardiovascular    Cardiovascular exam normal.         Dental      Dental appliance(s): lower dentures and upper dentures       Pulmonary    Pulmonary exam normal.                 Other findings              ASA: 2   Plan: MAC  NPO status verified and patient meets guidelines.    Post-procedure pain management plan discussed with surgeon and patient.    Comment: Plan IV sedation with GA backup.  Risks and benefits of MAC/GA explained including but not limited to aspiration, mouth/dental/airway injury, PONV explained.  Understanding expressed as well as a wish to proceed.    Plan/risks discussed with: patient                Present on Admission:  **None**

## 2024-05-16 NOTE — CM/SW NOTE
05/16/24 1200   CM/SW Referral Data   Referral Source Social Work (self-referral)   Reason for Referral Discharge planning;Other  (SDOH)   Informant Patient;EMR   Medical Hx   Does patient have an established PCP? Yes   Patient Info   Patient's Current Mental Status at Time of Assessment Alert;Oriented   Patient's Home Environment House   Number of Levels in Home 2   Patient lives with Parent(s)   Discharge Needs   Anticipated D/C needs Home health care;To be determined     HOME SITUATION  Type of Home: Other (Comment) (motel)   Home Layout: One level     Lives With: Alone  Patient Owned Equipment: None  Patient Regularly Uses: Glasses     Prior Level of [place]: Pt was independent with all levels of mobility PTA. Pt reports previously biking, swimming, and walking recreationally.  (Per PT evaluation)      Patient is a 60 y/o male who admitted with Anorexia, Abdominal pain.  SW acknowledged order for SDOH - Transportation issues.  Anticipated therapy need: Home with Home Healthcare    Met with patient, who was alert and oriented, to discuss discharge planning. He states he lives with his mother (who is about 91 y/o) in a 2 story house in [place]. His mother is in good health and active in the home. He is able to ambulate independently, he no longer drives. He shared that he has been on short term disability since November, he used to work for [employer]. He inquired about long term disability, encouraged him to reach out to his employer and/or social security disability. He applied for Medicaid and food stamps however was denied due to being over income/asset. Encouraged him to follow up if finances change. Discussed transportation barrier, he normally gets transport from his friend (Teresa). Informed him will add some transport resources on AVS. He states he does not have issues with bills, utilities, obtaining food. Confirmed PCP Dr. Wahl. Discussed HH, he is interested.    Sent referral in  juan david. Await accepting provider and choice.    SW/CM to remain available for support and/or discharge planning.    Megan See, URIEL  Discharge Planner  [phone]

## 2024-05-16 NOTE — PROGRESS NOTES
[facility]   part of [facility]    Progress Note     Cirilo Reyes Patient Status:  Inpatient    3/7/1965 MRN [id number]   Location [facility] 5NW-A Attending Ryan Betts MD   Hosp Day # 1 PCP Wil Wahl MD     Follow up for: The primary encounter diagnosis was Abdominal pain, generalized. Diagnoses of Hypokalemia, Dehydration, Anorexia, and ECG abnormal were also pertinent to this visit.      Interval History/Subjective:     MRCP mod-sev pancreatic atrophy diffusely w/o acute evidence of pancreatic inflammation or mass lesion. No acute biliary pathology noted  ANIRUDH overnight  Afebrile, HDS    Abdominal pain unchanged. Requesting morphine to be changed to dilaudid, thinks morphine is making his neuropathy worse. No other new or worsening symptoms.     Vital signs:  Temp:  [97.4 °F (36.3 °C)-98.8 °F (37.1 °C)] 98.3 °F (36.8 °C)  Pulse:  [62-76] 62  Resp:  [14-24] 16  BP: ()/(68-88) 122/86  SpO2:  [96 %-98 %] 96 %    Physical Exam:    General: NAD, Comfortable, Nontoxic   Respiratory: CTAB; reg resp rate & effort, no wheezes/crackles  Cardiovascular: S1, S2. Regular rate and rhythm. No murmurs appreciated  Abdomen: Soft, mild RUQ TTP, no guarding/rebound   Neurologic: No focal neurological deficits.   Extremities: No edema.   Skin: Dry, no rashes, ulcers or lesions     Diagnostic Data:      Labs:  Recent Labs   Lab 24  1357 05/15/24  0708   WBC 7.3 5.8   HGB 17.3 14.9   MCV 93.2 94.1   .0* 64.0*       Recent Labs   Lab 24  1357 05/15/24  0708   * 128*   BUN 6* 6*   CREATSERUM 0.82 0.46*   CA 9.4 7.9*   ALB 3.3* 1.6*   * 126*   K 3.1* 3.5   CL 84* 95*   CO2 35.0* 23.0   ALKPHO 262* 196*   * 85*   ALT 66* 51   BILT 3.2* 2.5*   TP 7.6 6.2*       No results for input(s): \"PTP\", \"INR\" in the last 168 hours.    No results for input(s): \"TROP\", \"CK\" in the last 168 hours.         Imaging: Imaging data reviewed in Epic.    Medications:    enoxaparin  40 mg  Subcutaneous Daily    escitalopram  20 mg Oral Daily    famotidine  20 mg Intravenous BID    multivitamin with minerals  1 tablet Oral Daily    insulin aspart  1-5 Units Subcutaneous TID AC and HS       ASSESSMENT / PLAN:       Cirilo Reyes Is a a 59 year old male who presents with dysphagia, progressive anorexia and severe (~25%) weight loss, found to have LFT elevation & atrophic pancreas on imaging      Problem List / Diagnoses     Dysphagia  History Esophageal Stricture  Anorexia  Weight Loss   Early Satiety   LFT Elevation / Pancreatic Atrophy   HERBERT (generalized anxiety disorder)  Major depression, recurrent, chronic (HCC)  Type 2 diabetes mellitus with hyperglycemia, without long-term current use of insulin ([facility])     Plan     Dysphagia  History Esophageal Stricture  Perera's  -- reports history esophageal stricture s/p dilation with temporary relief  -- follows with GI as outpatient, s/p EGD 3/2024 here with findings of 9cm circumferential Perera's Esophagus, multiple rings in proximal esophagus s/p dilation   -- GI consulted, plan discussed, EGD/Colonoscopy pending   -- iv famotidine BID while NPO      Anorexia  Weight Loss   Early Satiety   -- suspect secondary to chronic pancreatitis (below)  -- nutrition consult      LFT Elevation / Pancreatic Atrophy   Chronic Pancreatitis   -- Outpatient CT 11/2023 showed \"Abnormal appearance of the pancreas with focal atrophy of the mid pancreatic body and question of   tiny pancreatic tail hypodensities. This may relate to sequela of pancreatitis and diabetes,\"   -- MRI Pancreas recommended but never completed  -- GI following per above, plan discussed   -- MRCP consistent with atrophic pancreas, no acute biliary pathology   -- EGD/CScope planned for later today   -- resume pregabablin, start nortriptyline, reviewed with patient NSAIDs and opioids are not ideal options given      HERBERT (generalized anxiety disorder)  Major depression, recurrent, chronic (HCC)  --  holding home meds for now, resume lexapro following procedures      Type 2 diabetes mellitus with hyperglycemia, without long-term current use of insulin (HCC)  -- accuchecks, SSI-low     DVT Mechanical Prophylaxis:   SCDs,    DVT Pharmacologic Prophylaxis   Medication    enoxaparin (Lovenox) 40 MG/0.4ML SUBQ injection 40 mg              Code Status: FULL--default     Dispo: inpatient; HOWIE tomorrow pending clinical stability    Plan of care discussed with patient and/or family at bedside.    KANDI Betts MD  [facility]   [phone]      This note was created using voice recognition technology. It may include inadvertent transcriptional errors. Any such errors should be contextually interpreted and should not be taken to alter the content or the meaning.     Note to Patient: The 21st Century Cures Act makes medical notes like these available to patients in the interest of transparency. However, be advised this is a medical document. It is intended as peer to peer communication. It is written in medical language and may contain abbreviations or verbiage that are unfamiliar. It may appear blunt or direct. Medical documents are intended to carry relevant information, facts as evident, and the clinical opinion of the practitioner and not intended to be the primary source of your information.  Please refer directly to myself or clinical staff for information regarding plan of care.

## 2024-05-16 NOTE — ANESTHESIA POSTPROCEDURE EVALUATION
[facility]    Cirilo Reyes Patient Status:  Inpatient   Age/Gender 59 year old male MRN [id number]   Location [facility] ENDOSCOPY PAIN CENTER Attending Ryan Betts MD   Hosp Day # 1 PCP Wil Wahl MD       Anesthesia Post-op Note    ESOPHAGOGASTRODUODENOSCOPY (EGD) W/ BIOPSIES / COLONOSCOPY W/ HOT SNARE POLYPECTOMY & BIOPSIES    Procedure Summary       Date: 05/16/24 Room / Location:  ENDOSCOPY 03 / [facility] END[facility]    Anesthesia Start: 1525 Anesthesia Stop:     Procedures:       ESOPHAGOGASTRODUODENOSCOPY (EGD) W/ BIOPSIES / COLONOSCOPY W/ HOT SNARE POLYPECTOMY & BIOPSIES      COLONOSCOPY Diagnosis: (EGD: SMALL HIATAL HERNIA FROM 36CM-40CM, LONG SEGMENTED TAVERA'S ESOPHAGUS FROM 26CM-36CM, CHRONIC GASTRITIS  COLON: NORMAL TI, SEVERE CECUM INFLAMMATION, HEMORRHOIDS, ASCENDING COLON POLYPS)    Surgeons: Gabriele Luis MD Anesthesiologist: Lisa Whitfield MD    Anesthesia Type: MAC ASA Status: 2            Anesthesia Type: MAC    Vitals Value Taken Time   BP 96/69 05/16/24 1603   Temp  05/16/24 1606   Pulse 65 05/16/24 1605   Resp 10 05/16/24 1605   SpO2 98 % 05/16/24 1605   Vitals shown include unfiled device data.    Patient Location: Endoscopy    Anesthesia Type: MAC    Airway Patency: patent    Postop Pain Control: adequate    Mental Status: mildly sedated but able to meaningfully participate in the post-anesthesia evaluation    Nausea/Vomiting: none    Cardiopulmonary/Hydration status: stable euvolemic    Complications: no apparent anesthesia related complications    Postop vital signs: stable    Dental Exam: Unchanged from Preop    Patient to be discharged from PACU when criteria met.           No

## 2024-05-16 NOTE — OPERATIVE REPORT
EGD/Colonoscopy Operative Report    Cirilo Reyes Patient Status:  Inpatient    3/7/1965 MRN [id number]   [facility] ENDOSCOPY [facility]IN C[facility] Attending Ryan Betts MD   Hosp Day #   1 PCP Wil Wahl MD     Pre-Operative Diagnosis: Weight loss, esophageal dysphagia    Post-Operative Diagnosis:  - Long segment Perera's Esophagus (C9M10) extending from 26cm to 36cm at the GEJ. Extensively biopsied at the 3/2024 EGD. No overt stricture noted, may have resolved following dilation at 3/2024 EGD.  - Medium-sized hiatal hernia, extending from the GEJ at 36cm to 40cm from the incisors.  - Mild chronic-appearing erythema throughout the stomach, stomach otherwise normal. Biopsied from the body and antrum via cold forceps.  - Normal duodenum. Biopsied via cold forceps.    - Copious brown opaque semi-liquid stool throughout the colon. Interferes with visualization.  - Small non-bleeding Grade 1/2 internal hemorrhoids.  - Two 14-18mm sessile polyps in the proximal ascending colon. Resected via hot snare.  - Moderate-to-severe active inflammation with focal nodularity predominantly in the cecum (cannot rule out possible flat adenomas within this inflammation in the cecal base), but with less active involvement in the proximal ascending colon. Cannot rule out recent infection vs ischemic insult. Limited biopsies obtained from the cecum via cold forceps, procedure maintained with low-insufflation and decompressed on withdrawal for safety.  - Colon otherwise normal in setting of poor bowel preparation. No large lesions noted.  - Normal terminal ileum.    Procedure Performed:   EGD   COLONOSCOPY     Informed Consent: Informed consent for both the procedure and sedation were obtained from the patient. The potentially life-threatening complications of sedation, bleeding,   perforation, transfusion or repeat endoscopy were reviewed along with the possible need for hospitalization, surgical management, transfusion or repeat endoscopy should one of these complications arise. The patient understands and is agreeable to proceed.    Sedation Type: MAC. Patient received sedation with monitored anesthesia provided by an Anesthesiologist.  Moderate Sedation Time: None. Deep sedation provided by Anesthesia.    Cecum Withdrawal Time:  11 minutes    Date of previous colonoscopy: n/a    Procedure Description: The patient was placed in the left lateral decubitus position. A bite block was placed in the patient’s mouth. The endoscope was inserted through the mouth and advanced under direct visualization to the 3rd portion of the duodenum and was then withdrawn to examine the duodenal bulb and gastric antrum.  The endoscope was then retroflexed to examine the incisura, GE junction, cardia, body, and fundus, then withdrawn to examine the esophagus. The endoscope was then removed from the patient. The patient tolerated the procedure well with no immediate complications. The patient was then repositioned for colonoscopy.  After careful digital rectal examination, the Pediatric colonoscope was inserted into the rectum and advanced to the level of the cecum under direct visualization. The cecum was identified by landmarks, including the appendiceal orifice and ileocecal valve. Careful examination of the entire colon was performed during withdrawal of the endoscope. The scope was withdrawn to the rectum and retroflexion was performed.  The patient tolerated the procedure well with no immediate complications. The patient was transferred to the recovery area in stable condition.     Quality of Preparation: Inadequate    [place] Bowel Prep Scale:  4      Complications:  No immediate complications noted.    EGD Findings:  - Long segment Perera's Esophagus (C9M10) extending from 26cm to 36cm at the GEJ.  Extensively biopsied at the 3/2024 EGD. No overt stricture noted, may have resolved following dilation at 3/2024 EGD.  - Medium-sized hiatal hernia, extending from the GEJ at 36cm to 40cm from the incisors.  - Mild chronic-appearing erythema throughout the stomach, stomach otherwise normal. Biopsied from the body and antrum via cold forceps.  - Normal duodenum. Biopsied via cold forceps.    COL Findings:  - Copious brown opaque semi-liquid stool throughout the colon. Interferes with visualization.  - Small non-bleeding Grade 1/2 internal hemorrhoids.  - Two 14-18mm sessile polyps in the proximal ascending colon. Resected via hot snare.  - Moderate-to-severe active inflammation with focal nodularity predominantly in the cecum (cannot rule out possible flat adenomas within this inflammation in the cecal base), but with less active involvement in the proximal ascending colon. Cannot rule out recent infection vs ischemic insult. Limited biopsies obtained from the cecum via cold forceps, procedure maintained with low-insufflation and decompressed on withdrawal for safety.  - Colon otherwise normal in setting of poor bowel preparation. No large lesions noted.  - Normal terminal ileum.    Recommendations:  - Return to hospital [facility] for ongoing care.  - May resume regular diet upon return to [facility], depending on timing of CT scan.  - Advise obtaining a CTA Abdomen/Pelvis to evaluate for chronic mesenteric/vascular etiologies for ongoing weight loss, particularly given focal cecal inflammatory findings today.  - Strongly advise tobacco + alcohol cessation.  - Continue Protonix 40mg PO QAM for now, particularly given long-segment NDBE confirmed on 3/2024 EGD.  - Await pathology results.  - Return to primary care provider as advised.  - Findings were discussed with the patient and requested family/acquaintance today following procedure.    Discharge:  The patient was given an after visit summary detailing the procedure,  findings, recommendations and follow up plans.    Gabriele Luis MD  5/16/2024  3:26 PM

## 2024-05-17 LAB
ALBUMIN SERPL-MCNC: 2.3 G/DL (ref 3.4–5)
ALBUMIN/GLOB SERPL: 0.7 {RATIO} (ref 1–2)
ALP LIVER SERPL-CCNC: 159 U/L
ALT SERPL-CCNC: 42 U/L
ANION GAP SERPL CALC-SCNC: 6 MMOL/L (ref 0–18)
AST SERPL-CCNC: 108 U/L (ref 15–37)
BASOPHILS # BLD AUTO: 0.03 X10(3) UL (ref 0–0.2)
BASOPHILS NFR BLD AUTO: 1 %
BILIRUB SERPL-MCNC: 2 MG/DL (ref 0.1–2)
BUN BLD-MCNC: 2 MG/DL (ref 9–23)
CALCIUM BLD-MCNC: 8 MG/DL (ref 8.5–10.1)
CHLORIDE SERPL-SCNC: 102 MMOL/L (ref 98–112)
CO2 SERPL-SCNC: 27 MMOL/L (ref 21–32)
CREAT BLD-MCNC: 0.44 MG/DL
EGFRCR SERPLBLD CKD-EPI 2021: 121 ML/MIN/1.73M2 (ref 60–?)
EOSINOPHIL # BLD AUTO: 0.08 X10(3) UL (ref 0–0.7)
EOSINOPHIL NFR BLD AUTO: 2.7 %
ERYTHROCYTE [DISTWIDTH] IN BLOOD BY AUTOMATED COUNT: 12.5 %
GLOBULIN PLAS-MCNC: 3.2 G/DL (ref 2.8–4.4)
GLUCOSE BLD-MCNC: 113 MG/DL (ref 70–99)
GLUCOSE BLD-MCNC: 124 MG/DL (ref 70–99)
GLUCOSE BLD-MCNC: 142 MG/DL (ref 70–99)
GLUCOSE BLD-MCNC: 211 MG/DL (ref 70–99)
GLUCOSE BLD-MCNC: 223 MG/DL (ref 70–99)
HCT VFR BLD AUTO: 38.9 %
HGB BLD-MCNC: 13.6 G/DL
IMM GRANULOCYTES # BLD AUTO: 0.02 X10(3) UL (ref 0–1)
IMM GRANULOCYTES NFR BLD: 0.7 %
LYMPHOCYTES # BLD AUTO: 0.5 X10(3) UL (ref 1–4)
LYMPHOCYTES NFR BLD AUTO: 16.8 %
MCH RBC QN AUTO: 34.7 PG (ref 26–34)
MCHC RBC AUTO-ENTMCNC: 35 G/DL (ref 31–37)
MCV RBC AUTO: 99.2 FL
MONOCYTES # BLD AUTO: 0.35 X10(3) UL (ref 0.1–1)
MONOCYTES NFR BLD AUTO: 11.8 %
NEUTROPHILS # BLD AUTO: 1.99 X10 (3) UL (ref 1.5–7.7)
NEUTROPHILS # BLD AUTO: 1.99 X10(3) UL (ref 1.5–7.7)
NEUTROPHILS NFR BLD AUTO: 67 %
OSMOLALITY SERPL CALC.SUM OF ELEC: 279 MOSM/KG (ref 275–295)
PLATELET # BLD AUTO: 50 10(3)UL (ref 150–450)
POTASSIUM SERPL-SCNC: 3.4 MMOL/L (ref 3.5–5.1)
POTASSIUM SERPL-SCNC: 3.8 MMOL/L (ref 3.5–5.1)
PROT SERPL-MCNC: 5.5 G/DL (ref 6.4–8.2)
RBC # BLD AUTO: 3.92 X10(6)UL
SODIUM SERPL-SCNC: 135 MMOL/L (ref 136–145)
VIT D+METAB SERPL-MCNC: 8.4 NG/ML (ref 30–100)
WBC # BLD AUTO: 3 X10(3) UL (ref 4–11)

## 2024-05-17 PROCEDURE — S0028 INJECTION, FAMOTIDINE, 20 MG: HCPCS | Performed by: HOSPITALIST

## 2024-05-17 PROCEDURE — 84132 ASSAY OF SERUM POTASSIUM: CPT | Performed by: HOSPITALIST

## 2024-05-17 PROCEDURE — 85025 COMPLETE CBC W/AUTO DIFF WBC: CPT | Performed by: HOSPITALIST

## 2024-05-17 PROCEDURE — 82962 GLUCOSE BLOOD TEST: CPT

## 2024-05-17 PROCEDURE — 80053 COMPREHEN METABOLIC PANEL: CPT | Performed by: HOSPITALIST

## 2024-05-17 RX ORDER — POTASSIUM CHLORIDE 20 MEQ/1
40 TABLET, EXTENDED RELEASE ORAL ONCE
Status: COMPLETED | OUTPATIENT
Start: 2024-05-17 | End: 2024-05-17

## 2024-05-17 RX ORDER — NORTRIPTYLINE HYDROCHLORIDE 10 MG/1
10 CAPSULE ORAL NIGHTLY
Status: DISCONTINUED | OUTPATIENT
Start: 2024-05-17 | End: 2024-05-17

## 2024-05-17 RX ORDER — PANTOPRAZOLE SODIUM 40 MG/1
40 TABLET, DELAYED RELEASE ORAL
Status: DISCONTINUED | OUTPATIENT
Start: 2024-05-17 | End: 2024-05-19

## 2024-05-17 RX ORDER — POTASSIUM CHLORIDE 20 MEQ/1
40 TABLET, EXTENDED RELEASE ORAL EVERY 4 HOURS
Qty: 4 TABLET | Refills: 0 | Status: COMPLETED | OUTPATIENT
Start: 2024-05-17 | End: 2024-05-17

## 2024-05-17 NOTE — PROGRESS NOTES
Assumed care of pt around 0730. Pt AO x4. VSS on RA. Refusing tele. Tolerating diet. Pt has c/o pain, prn meds given. Pt up ad gretel. Updated on poc

## 2024-05-17 NOTE — PROGRESS NOTES
[facility] GI Follow-up    5/17/2024    Cirilo Reyes  male   Gabriele Luis MD     [id number]  3/7/1965 Primary Care Physician  Wil Wahl MD     S: NAEO, some improvement in appetite since yesterday. Feels like he wants to eat. Continued abd pain.    PROBLEM LIST:     Patient Active Problem List   Diagnosis    Abdominal pain, generalized    Hypokalemia    Dehydration    Anorexia    ECG abnormal       Medications:    Current Facility-Administered Medications:     potassium chloride (Klor-Con M20) tab 40 mEq, 40 mEq, Oral, Q4H    HYDROmorphone (Dilaudid) 1 MG/ML injection 0.1 mg, 0.1 mg, Intravenous, Q2H PRN **OR** HYDROmorphone (Dilaudid) 1 MG/ML injection 0.2 mg, 0.2 mg, Intravenous, Q2H PRN **OR** HYDROmorphone (Dilaudid) 1 MG/ML injection 0.4 mg, 0.4 mg, Intravenous, Q2H PRN    nortriptyline (Pamelor) cap 10 mg, 10 mg, Oral, Nightly    pregabalin (Lyrica) cap 50 mg, 50 mg, Oral, TID    enoxaparin (Lovenox) 40 MG/0.4ML SUBQ injection 40 mg, 40 mg, Subcutaneous, Daily    polyethylene glycol (PEG 3350) (Miralax) 17 g oral packet 17 g, 17 g, Oral, Daily PRN    sennosides (Senokot) tab 17.2 mg, 17.2 mg, Oral, Nightly PRN    bisacodyl (Dulcolax) 10 MG rectal suppository 10 mg, 10 mg, Rectal, Daily PRN    fleet enema (Fleet) 7-19 GM/118ML rectal enema 133 mL, 1 enema, Rectal, Once PRN    ondansetron (Zofran) 4 MG/2ML injection 4 mg, 4 mg, Intravenous, Q6H PRN    prochlorperazine (Compazine) 10 MG/2ML injection 5 mg, 5 mg, Intravenous, Q8H PRN    melatonin tab 3 mg, 3 mg, Oral, Nightly PRN    acetaminophen (Tylenol) tab 650 mg, 650 mg, Oral, Q4H PRN **OR** HYDROcodone-acetaminophen (Norco) 5-325 MG per tab 1 tablet, 1 tablet, Oral, Q4H PRN **OR** HYDROcodone-acetaminophen (Norco) 5-325 MG per tab 2 tablet, 2 tablet, Oral, Q4H PRN    acetaminophen (Tylenol Extra Strength) tab 500 mg, 500 mg, Oral, Q4H PRN    escitalopram (Lexapro) tab 20 mg, 20 mg, Oral, Daily    famotidine (Pepcid) 20 mg/2mL injection 20  mg, 20 mg, Intravenous, BID    artificial saliva substitute (Biotene) oral solution, , Oral, PRN    multivitamin with minerals (Thera M Plus) tab 1 tablet, 1 tablet, Oral, Daily    glucose (Dex4) 15 GM/59ML oral liquid 15 g, 15 g, Oral, Q15 Min PRN **OR** glucose (Glutose) 40% oral gel 15 g, 15 g, Oral, Q15 Min PRN **OR** glucose-vitamin C (Dex-4) chewable tab 4 tablet, 4 tablet, Oral, Q15 Min PRN **OR** dextrose 50% injection 50 mL, 50 mL, Intravenous, Q15 Min PRN **OR** glucose (Dex4) 15 GM/59ML oral liquid 30 g, 30 g, Oral, Q15 Min PRN **OR** glucose (Glutose) 40% oral gel 30 g, 30 g, Oral, Q15 Min PRN **OR** glucose-vitamin C (Dex-4) chewable tab 8 tablet, 8 tablet, Oral, Q15 Min PRN    insulin aspart (NovoLOG) 100 Units/mL FlexPen 1-5 Units, 1-5 Units, Subcutaneous, TID AC and HS       EXAM:   /78 (BP Location: Left arm)   Pulse 78   Temp 97.4 °F (36.3 °C) (Oral)   Resp 14   Ht 6' (1.829 m)   Wt 145 lb (65.8 kg)   SpO2 96%   BMI 19.67 kg/m²  Body mass index is 19.67 kg/m².  Gen: cooperative, pleasant, not diaphoretic, nad   HEENT: ncat, normal neck ROM, normal op w/o ulcer/exudate, anicteric, mmm   Resp/Chest: normal respiratory effort, not dyspneic, no incr WOB/cough  CV: no reported palpitations or syncope  Abd: nt, nd, no clinical features suggestive of peritoneal s/s noted  Ext: no c/c/e   Skin: warm, perfused, no jaundice, no pallor  Neuro: aaox3, grossly intact, no asterixis noted, normal speech       LAB/IMAGING RESULTS:     Lab Results   Component Value Date    WBC 3.0 05/17/2024    HGB 13.6 05/17/2024    HCT 38.9 05/17/2024    PLT 50.0 05/17/2024     [unfilled]  Lab Results   Component Value Date    WBC 3.0 05/17/2024    HGB 13.6 05/17/2024    HCT 38.9 05/17/2024    PLT 50.0 05/17/2024    CREATSERUM 0.44 05/17/2024    BUN 2 05/17/2024     05/17/2024    K 3.4 05/17/2024     05/17/2024    CO2 27.0 05/17/2024     05/17/2024    CA 8.0 05/17/2024    ALB 2.3 05/17/2024     ALKPHO 159 05/17/2024    BILT 2.0 05/17/2024    TP 5.5 05/17/2024     05/17/2024    ALT 42 05/17/2024    PGLU 113 05/17/2024     EGD + COL 5/16 (path pending)  EGD Findings:  - Long segment Perera's Esophagus (C9M10) extending from 26cm to 36cm at the GEJ. Extensively biopsied at the 3/2024 EGD. No overt stricture noted, may have resolved following dilation at 3/2024 EGD.  - Medium-sized hiatal hernia, extending from the GEJ at 36cm to 40cm from the incisors.  - Mild chronic-appearing erythema throughout the stomach, stomach otherwise normal. Biopsied from the body and antrum via cold forceps.  - Normal duodenum. Biopsied via cold forceps.     COL Findings:  - Copious brown opaque semi-liquid stool throughout the colon. Interferes with visualization.  - Small non-bleeding Grade 1/2 internal hemorrhoids.  - Two 14-18mm sessile polyps in the proximal ascending colon. Resected via hot snare.  - Moderate-to-severe active inflammation with focal nodularity predominantly in the cecum (cannot rule out possible flat adenomas within this inflammation in the cecal base), but with less active involvement in the proximal ascending colon. Cannot rule out recent infection vs ischemic insult. Limited biopsies obtained from the cecum via cold forceps, procedure maintained with low-insufflation and decompressed on withdrawal for safety.  - Colon otherwise normal in setting of poor bowel preparation. No large lesions noted.  - Normal terminal ileum.    CTA AP 5/16  1. No significant stenosis or aneurysmal dilatation of the aorta or its major branches.      2. Fatty infiltration of the liver.      3. Small hiatal hernia.  Periesophageal lymph node measures 1.1 x 0.9 cm.      4.  Atrophic changes of pancreatic body and tail are noted.  Verify fat infiltration of the colonic wall may be sequelae of chronic inflammatory process.  No evidence of acute inflammatory process at the current time.      5. Mild bladder wall thickening  may be sequelae of infectious process.  This can also be sequelae of chronic obstruction.  Minimal free fluid is of unknown etiology.     ASSESSMENT AND PLAN:   #H/o prior acute pancreatitis, imaging features suggestive of pancreatic atrophy (likely chronic per EUS 3/2024)  #Perera's Esophagus  #Esophageal dysphagia, complicated strictures noted on EGD 3/2024  #Chronic tobacco dependence (advised to quit)  #H/o alcohol use (not currently)  #Family history of pancreatic CA    EGD + COL findings as above, nonspecific cecal inflammation in setting of poor prep.  CTA unrevealing for large/medium-vessel disease, ischemia less likely. Possible infection?  Check stool studies (ordered).  Continue low-dose nortriptyline 10mg PO at bedtime as it does seem to help w/ some of the chronic abdominal pain.  Appetite has returned, but could consider mirtazapine trial next if TCA not tolerated.  DC Pepcid, transition to Protonix 40mg PO QAM instead (can continue upon dc).  Resume PERT this admission, but unclear if pt will be able to continue as outpt d/t cost/insurance concerns.  Continue work w/ Nutrition this admission.  Added nutritional markers to AM labs.  Will continue to follow w/ you.  Discussed w/ pt + RN + Hospitalist today.  May be coming to end of inpt GI evaluation, further mgmt may need to take place w/ appropriate outpt GI f/u. Discussed w/ pt today.    The patient indicates understanding of these issues and agrees to the plan.    A total of 50 minutes was spent in direct patient care + assessment, chart review, and care coordination today.    Gabriele Luis MD  Department of Gastroenterology  [facility]  5/17/2024  10:41 AM

## 2024-05-17 NOTE — DIETARY MALNUTRITION NOTE
[facility]   part of [facility]  NUTRITION ASSESSMENT    Pt meets severe malnutrition criteria at this time.    CRITERIA FOR MALNUTRITION DIAGNOSIS:  Criteria for severe malnutrition diagnosis: chronic illness related to wt loss greater than 20% in 1 year, energy intake less than 75% for greater than 1 month, body fat severe depletion, and muscle mass severe depletion    NUTRITION INTERVENTION:    RD nutrition Care Plan- Initiated ONS (oral nutritional supplements), Ordered multivitamin, and See RD nutrition assessment for additional recommendations  Meal and Snacks - Continue CHO Controlled diet as tolerated; monitor patient po intake. Encourage adequate po of appropriate diet.  Medical Food Supplements - Change to Glucerna BID. Rationale/use for oral supplements discussed.  Vitamin and Mineral Supplements - Continue Multivitamin with minerals  Coordination of Nutrition Care - GI/Surgery consult for nutrition support/diet advancement     PATIENT STATUS: 5/17: MRCP shoed mod-sev pancreatic atrophy diffusely. Pt then had EGD and Cscope yesterday with GI which showed Perera's esophagus, hiatal hernia, hemorrhoids, polyps, mod-sev active inflammation with focal nodularity in cecum.Visited pt at bedside. He reports having more of an appetite today and ate dinner last night and bfast this AM. Drank Ensure Clear this morning and requesting Glucerna moving forward. Reports having nausea and abdominal pain after eating today; briefly discussed PERT therapy which pt reports he is planning to start once financial assistance is complete. Provided Glucerna coupons for discharge.    5/15: 59 year old male admitted on 5/14 presents with abdominal pain. Pt screened d/t MST score 5. Visited pt at bedside. Pt reports poor appetite/PO intake with no desire to eat which has just been worsening. Reports he didn't eat anything for 4 days PTA and still doesn't feel hungry. Reports his UBW 9 months ago was 200 lbs with current  wt 145 lbs. Reports having abdominal pain which has been constant over the past 4 days and is usually associated with what he eats. Also having N/V daily over the past week. Reports last BM 2 days ago but with constipation d/t poor PO intake. Reports having long standing hx of swallowing difficulty and has had multiple UGI work ups and dilations. NKFA. Reports he likes Ensure but is it expensive and he does not have any reliable transportation to go to store. Offered ONS while here and pt agreeable to berry Ensure Clear and any flavor ONS once diet advanced.    PMH: Diabetes, Esophageal reflux, Neuropathy, Problems with swallowing    ANTHROPOMETRICS:  Ht: 182.9 cm (6')  Wt: 65.8 kg (145 lb).   BMI: Body mass index is 19.67 kg/m².  IBW: 80.9 kg    WEIGHT HISTORY: Pt reports ~55 lb wt loss x 9 months (27.5%, significant per standards); noted 12 lb wt loss x 2 months per chart (7.6%).  Patient Weight(s) for the past 336 hrs:   Weight   05/15/24 0106 65.8 kg (145 lb)   05/14/24 1346 65.8 kg (145 lb)       Wt Readings from Last 10 Encounters:   05/15/24 65.8 kg (145 lb)   05/15/24 65.8 kg (145 lb 1 oz)   03/12/24 71.2 kg (157 lb)   01/20/14 86.2 kg (190 lb 2 oz)        NUTRITION:  Diet:       Procedures    Carbohydrate controlled diet 1800 kcal/60 grams; Is Patient on Accuchecks? Yes        Percent Meals Eaten (last 3 days)       None            Food Allergies: No  Cultural/Ethnic/Mandaen Preferences Addressed: Yes    GI SYSTEM REVIEW: constipation, nausea, and abdominal pain; last BM 5/15  Skin/Wounds: WNL    NUTRITION RELATED PHYSICAL FINDINGS:     1. Body Fat/Muscle Mass: moderate depletion body fat Buccal fat pad, severe depletion body fat Triceps, moderate muscle depletion Temple region and Clavicle region, and severe muscle depletion Thigh region and Calf region     2. Fluid Accumulation: none per RN documentation     NUTRITION PRESCRIPTION: 65.8 kg  Calories: 9473-0481 calories/day (30-35 kcal/kg)  Protein:  79-99 grams protein/day (1.2-1.5 gm/kg)  Fluid: ~1 ml/kcal or per MD discretion    NUTRITION DIAGNOSIS/PROBLEM:  Malnutrition related to physiological causes as evidenced by documented/reported insufficient oral intake, documented/reported unintentional weight loss, loss of fat mass, and loss of muscle mass    MONITOR AND EVALUATE/NUTRITION GOALS:  PO intake of 75% of meals TID - Not met, Continues  PO intake of 75% of oral nutrition supplement/s - Not met, Continues  Weight stable within 1 to 2 lbs during admission - Ongoing      MEDICATIONS:  novolog, multivitamin with minerals, protonix, prn zofran    LABS:  , Na 135, K+ 3.4, POC glucose x 24hrs:  mg/dl    Pt is at Moderate nutrition risk    Azeb Gramajo RD, LDN, [facility]  Clinical Dietitian  Spectra: [phone]

## 2024-05-17 NOTE — PLAN OF CARE
Pt Aox4. Spo2 >90% on RA. NSR on tele, refusing tele at this time. Lovenox. Abd pain, prn meds w relief. Up SBA. QID accucheck. IVF. Pt updated on POC. Call light within reach, safety precautions in place. No further pt needs at this time.     Problem: Diabetes/Glucose Control  Goal: Glucose maintained within prescribed range  Description: INTERVENTIONS:  - Monitor Blood Glucose as ordered  - Assess for signs and symptoms of hyperglycemia and hypoglycemia  - Administer ordered medications to maintain glucose within target range  - Assess barriers to adequate nutritional intake and initiate nutrition consult as needed  - Instruct patient on self management of diabetes  Outcome: Progressing     Problem: Patient/Family Goals  Goal: Patient/Family Long Term Goal  Description: Patient's Long Term Goal: Discharge home    Interventions:  - See additional Care Plan goals for specific interventions  Outcome: Progressing  Goal: Patient/Family Short Term Goal  Description: Patient's Short Term Goal:  5/15 noc: get rest   5/16 AM: manage pain  5/16noc: sleep  Interventions:   - cluster care  - See additional Care Plan goals for specific interventions  Outcome: Progressing

## 2024-05-17 NOTE — CM/SW NOTE
05/17/24 1026   Choice of Post-Acute Provider   Informed patient of right to choose their preferred provider Yes   List of appropriate post-acute services provided to patient/family with quality data Yes   Patient/family choice [facility]   Information given to Patient     Met with patient to follow up with HH. Provided accepting HH list from aidin. He chose [facility]. Reserved in aidin.    SW/CM to remain available for support and/or discharge planning.    LINDA Breaux  Discharge Planner  [phone]

## 2024-05-17 NOTE — PROGRESS NOTES
[facility]   part of [facility]    Progress Note     Cirilo Reyes Patient Status:  Inpatient    3/7/1965 MRN [id number]   Location [facility] 5NW-A Attending Ryan Betts MD   Hosp Day # 2 PCP Wil Wahl MD     Follow up for: The primary encounter diagnosis was Abdominal pain, generalized. Diagnoses of Hypokalemia, Dehydration, Anorexia, and ECG abnormal were also pertinent to this visit.      Interval History/Subjective:     EGD/Cscope yesterday with poor prep; mod-sev inflammation primarily in cecum, long segment of Perera's redemonstrated   CTA Abd/Pelvis negative for evidence of mesenteric ischemia     ANIRUDH overnight  Afebrile, HDS    Appetite improving, still with intermittent cramping abdominal pain    Vital signs:  Temp:  [97.4 °F (36.3 °C)-98.4 °F (36.9 °C)] 97.4 °F (36.3 °C)  Pulse:  [60-80] 78  Resp:  [10-16] 14  BP: ()/(69-87) 109/78  SpO2:  [96 %-98 %] 96 %    Physical Exam:    General: NAD, Comfortable, Nontoxic   Respiratory: CTAB; reg resp rate & effort, no wheezes/crackles  Cardiovascular: S1, S2. Regular rate and rhythm. No murmurs appreciated  Abdomen: Soft, mild RUQ TTP, no guarding/rebound   Neurologic: No focal neurological deficits.   Extremities: No edema.   Skin: Dry, no rashes, ulcers or lesions     Diagnostic Data:      Labs:  Recent Labs   Lab 24  1357 05/15/24  0708 24  0850 24  0717   WBC 7.3 5.8 4.3 3.0*   HGB 17.3 14.9 14.2 13.6   MCV 93.2 94.1 94.5 99.2   .0* 64.0* 69.0* 50.0*       Recent Labs   Lab 05/15/24  0708 24  0851 24  0717   * 102* 142*   BUN 6* 3* 2*   CREATSERUM 0.46* 0.46* 0.44*   CA 7.9* 8.3* 8.0*   ALB 1.6* 2.8* 2.3*   * 134* 135*   K 3.5 2.8* 3.4*   CL 95* 94* 102   CO2 23.0 33.0* 27.0   ALKPHO 196* 179* 159*   AST 85* 100* 108*   ALT 51 44 42   BILT 2.5* 2.1* 2.0   TP 6.2* 6.1* 5.5*       No results for input(s): \"PTP\", \"INR\" in the last 168 hours.    No results for input(s): \"TROP\", \"CK\"  in the last 168 hours.         Imaging: Imaging data reviewed in Epic.    Medications:    potassium chloride  40 mEq Oral Q4H    nortriptyline  10 mg Oral Nightly    pregabalin  50 mg Oral TID    enoxaparin  40 mg Subcutaneous Daily    escitalopram  20 mg Oral Daily    famotidine  20 mg Intravenous BID    multivitamin with minerals  1 tablet Oral Daily    insulin aspart  1-5 Units Subcutaneous TID AC and HS       ASSESSMENT / PLAN:       Cirilo Reyes Is a a 59 year old male who presents with dysphagia, progressive anorexia and severe (~25%) weight loss, found to have LFT elevation & atrophic pancreas on imaging      Problem List / Diagnoses     Dysphagia  History Esophageal Stricture  Anorexia  Weight Loss   Early Satiety   LFT Elevation / Pancreatic Atrophy   HERBERT (generalized anxiety disorder)  Major depression, recurrent, chronic (HCC)  Type 2 diabetes mellitus with hyperglycemia, without long-term current use of insulin (HCC)     Plan     Dysphagia  History Esophageal Stricture  Perera's  -- reports history esophageal stricture s/p dilation with temporary relief  -- follows with GI as outpatient, s/p EGD 3/2024 here with findings of 9cm circumferential Perera's Esophagus, multiple rings in proximal esophagus s/p dilation   -- GI consulted, plan discussed, EGD/Colonoscopy pending   -- iv famotidine BID while NPO      Anorexia  Weight Loss   Early Satiety   -- suspect secondary to chronic pancreatitis (below)  -- nutrition consult      LFT Elevation / Pancreatic Atrophy   Chronic Pancreatitis   -- Outpatient CT 11/2023 showed \"Abnormal appearance of the pancreas with focal atrophy of the mid pancreatic body and question of tiny pancreatic tail hypodensities. This may relate to sequela of pancreatitis and diabetes,\"   -- GI following per above, plan discussed   -- MRCP consistent with atrophic pancreas, no acute biliary pathology   -- EGD/CScope notable for inflammation of cecum, otherwise no significant  pathology   -- Stool enzymes & studies pending   -- resume pregabablin, start nortriptyline, reviewed with patient NSAIDs and opioids are not ideal options given      HERBERT (generalized anxiety disorder)  Major depression, recurrent, chronic (HCC)  -- holding home meds for now, resume lexapro following procedures      Type 2 diabetes mellitus with hyperglycemia, without long-term current use of insulin (HCC)  -- accuchecks, SSI-low     Acute On Chronic Thrombocytopenia  -- prev baseline 110-150  -- 119 on admission, dropped to 64 HD #1, suspect dilutional vs acute reactive in the setting of colitis noted on colonoscopy   -- has ranged 50-69 since  -- 4Ts =2, low probability of HIT, cont lovenox  -- hold dvt prophylaxis if Plt < 50, transfuse if develops bleeding <50 or plt count < 20   -- evaluated by Hematology 3/4 as outpatient, with suspicion for liver dysfunction as underlying etiology   -- at that visit Hematologist recommended consideration for bone marrow biopsy if TCP worsens  -- will consult Hem/Onc for further eval if continues to drop tomorrow     DVT Mechanical Prophylaxis:   SCDs,    DVT Pharmacologic Prophylaxis   Medication    enoxaparin (Lovenox) 40 MG/0.4ML SUBQ injection 40 mg              Code Status: FULL--default     Dispo: inpatient; HOWIE 1-2 days pending clinical improvement, results of stool studies, improved platelets    Plan of care discussed with patient and/or family at bedside.    KANDI Betts MD  [facility]   [phone]      This note was created using voice recognition technology. It may include inadvertent transcriptional errors. Any such errors should be contextually interpreted and should not be taken to alter the content or the meaning.     Note to Patient: The 21st Century Cures Act makes medical notes like these available to patients in the interest of transparency. However, be advised this is a medical document. It is intended as peer to peer communication. It is  written in medical language and may contain abbreviations or verbiage that are unfamiliar. It may appear blunt or direct. Medical documents are intended to carry relevant information, facts as evident, and the clinical opinion of the practitioner and not intended to be the primary source of your information.  Please refer directly to myself or clinical staff for information regarding plan of care.

## 2024-05-18 LAB
ALBUMIN SERPL-MCNC: 2.6 G/DL (ref 3.4–5)
ALBUMIN/GLOB SERPL: 0.8 {RATIO} (ref 1–2)
ALP LIVER SERPL-CCNC: 189 U/L
ALT SERPL-CCNC: 61 U/L
ANION GAP SERPL CALC-SCNC: 3 MMOL/L (ref 0–18)
AST SERPL-CCNC: 137 U/L (ref 15–37)
BASOPHILS # BLD AUTO: 0.02 X10(3) UL (ref 0–0.2)
BASOPHILS NFR BLD AUTO: 0.7 %
BILIRUB DIRECT SERPL-MCNC: 1.4 MG/DL (ref 0–0.2)
BILIRUB SERPL-MCNC: 2.3 MG/DL (ref 0.1–2)
BUN BLD-MCNC: 1 MG/DL (ref 9–23)
CALCIUM BLD-MCNC: 8.5 MG/DL (ref 8.5–10.1)
CHLORIDE SERPL-SCNC: 103 MMOL/L (ref 98–112)
CO2 SERPL-SCNC: 30 MMOL/L (ref 21–32)
CREAT BLD-MCNC: 0.37 MG/DL
EGFRCR SERPLBLD CKD-EPI 2021: 127 ML/MIN/1.73M2 (ref 60–?)
EOSINOPHIL # BLD AUTO: 0.08 X10(3) UL (ref 0–0.7)
EOSINOPHIL NFR BLD AUTO: 2.7 %
ERYTHROCYTE [DISTWIDTH] IN BLOOD BY AUTOMATED COUNT: 12.4 %
FOLATE SERPL-MCNC: 4.2 NG/ML (ref 8.7–?)
GLOBULIN PLAS-MCNC: 3.1 G/DL (ref 2.8–4.4)
GLUCOSE BLD-MCNC: 111 MG/DL (ref 70–99)
GLUCOSE BLD-MCNC: 164 MG/DL (ref 70–99)
GLUCOSE BLD-MCNC: 169 MG/DL (ref 70–99)
GLUCOSE BLD-MCNC: 215 MG/DL (ref 70–99)
GLUCOSE BLD-MCNC: 221 MG/DL (ref 70–99)
HCT VFR BLD AUTO: 36.4 %
HGB BLD-MCNC: 12.3 G/DL
IMM GRANULOCYTES # BLD AUTO: 0.01 X10(3) UL (ref 0–1)
IMM GRANULOCYTES NFR BLD: 0.3 %
LYMPHOCYTES # BLD AUTO: 0.87 X10(3) UL (ref 1–4)
LYMPHOCYTES NFR BLD AUTO: 29.5 %
MCH RBC QN AUTO: 33.3 PG (ref 26–34)
MCHC RBC AUTO-ENTMCNC: 33.8 G/DL (ref 31–37)
MCV RBC AUTO: 98.6 FL
MONOCYTES # BLD AUTO: 0.36 X10(3) UL (ref 0.1–1)
MONOCYTES NFR BLD AUTO: 12.2 %
NEUTROPHILS # BLD AUTO: 1.61 X10 (3) UL (ref 1.5–7.7)
NEUTROPHILS # BLD AUTO: 1.61 X10(3) UL (ref 1.5–7.7)
NEUTROPHILS NFR BLD AUTO: 54.6 %
OSMOLALITY SERPL CALC.SUM OF ELEC: 282 MOSM/KG (ref 275–295)
PLATELET # BLD AUTO: 60 10(3)UL (ref 150–450)
PLATELETS.RETICULATED NFR BLD AUTO: 6.7 % (ref 0–7)
POTASSIUM SERPL-SCNC: 4.3 MMOL/L (ref 3.5–5.1)
POTASSIUM SERPL-SCNC: 4.3 MMOL/L (ref 3.5–5.1)
PROT SERPL-MCNC: 5.7 G/DL (ref 6.4–8.2)
RBC # BLD AUTO: 3.69 X10(6)UL
SODIUM SERPL-SCNC: 136 MMOL/L (ref 136–145)
VIT B12 SERPL-MCNC: 1823 PG/ML (ref 193–986)
WBC # BLD AUTO: 3 X10(3) UL (ref 4–11)

## 2024-05-18 PROCEDURE — 82962 GLUCOSE BLOOD TEST: CPT

## 2024-05-18 PROCEDURE — 85025 COMPLETE CBC W/AUTO DIFF WBC: CPT | Performed by: HOSPITALIST

## 2024-05-18 PROCEDURE — 82607 VITAMIN B-12: CPT | Performed by: INTERNAL MEDICINE

## 2024-05-18 PROCEDURE — 82248 BILIRUBIN DIRECT: CPT | Performed by: INTERNAL MEDICINE

## 2024-05-18 PROCEDURE — 82746 ASSAY OF FOLIC ACID SERUM: CPT | Performed by: INTERNAL MEDICINE

## 2024-05-18 PROCEDURE — 80053 COMPREHEN METABOLIC PANEL: CPT | Performed by: HOSPITALIST

## 2024-05-18 PROCEDURE — 84132 ASSAY OF SERUM POTASSIUM: CPT | Performed by: HOSPITALIST

## 2024-05-18 NOTE — PLAN OF CARE
Pt A&O x4. RA refusing tele VSS, PRN pain meds, R IV SL. POC discussed. Educated to use call light for assistance  Problem: Patient/Family Goals  Goal: Patient/Family Long Term Goal  Description: Patient's Long Term Goal: Discharge home    Interventions:  - See additional Care Plan goals for specific interventions  Outcome: Progressing

## 2024-05-18 NOTE — PROGRESS NOTES
[facility] GI Follow-up    5/18/2024    Cirilo Reyes  male   Gabriele Luis MD     [id number]  3/7/1965 Primary Care Physician  Wil Wahl MD     S: NAEO. Eating better as admission goes on, tolerating low-dose TCA at bedtime which may be helping sleep + some component of appetite.    PROBLEM LIST:     Patient Active Problem List   Diagnosis    Abdominal pain, generalized    Hypokalemia    Dehydration    Anorexia    ECG abnormal       Medications:    Current Facility-Administered Medications:     pantoprazole (Protonix) DR tab 40 mg, 40 mg, Oral, QAM AC    HYDROmorphone (Dilaudid) 1 MG/ML injection 0.1 mg, 0.1 mg, Intravenous, Q2H PRN **OR** HYDROmorphone (Dilaudid) 1 MG/ML injection 0.2 mg, 0.2 mg, Intravenous, Q2H PRN **OR** HYDROmorphone (Dilaudid) 1 MG/ML injection 0.4 mg, 0.4 mg, Intravenous, Q2H PRN    nortriptyline (Pamelor) cap 10 mg, 10 mg, Oral, Nightly    pregabalin (Lyrica) cap 50 mg, 50 mg, Oral, TID    enoxaparin (Lovenox) 40 MG/0.4ML SUBQ injection 40 mg, 40 mg, Subcutaneous, Daily    polyethylene glycol (PEG 3350) (Miralax) 17 g oral packet 17 g, 17 g, Oral, Daily PRN    sennosides (Senokot) tab 17.2 mg, 17.2 mg, Oral, Nightly PRN    bisacodyl (Dulcolax) 10 MG rectal suppository 10 mg, 10 mg, Rectal, Daily PRN    fleet enema (Fleet) 7-19 GM/118ML rectal enema 133 mL, 1 enema, Rectal, Once PRN    ondansetron (Zofran) 4 MG/2ML injection 4 mg, 4 mg, Intravenous, Q6H PRN    prochlorperazine (Compazine) 10 MG/2ML injection 5 mg, 5 mg, Intravenous, Q8H PRN    melatonin tab 3 mg, 3 mg, Oral, Nightly PRN    acetaminophen (Tylenol) tab 650 mg, 650 mg, Oral, Q4H PRN **OR** HYDROcodone-acetaminophen (Norco) 5-325 MG per tab 1 tablet, 1 tablet, Oral, Q4H PRN **OR** HYDROcodone-acetaminophen (Norco) 5-325 MG per tab 2 tablet, 2 tablet, Oral, Q4H PRN    acetaminophen (Tylenol Extra Strength) tab 500 mg, 500 mg, Oral, Q4H PRN    escitalopram (Lexapro) tab 20 mg, 20 mg, Oral, Daily    artificial saliva  substitute (Biotene) oral solution, , Oral, PRN    multivitamin with minerals (Thera M Plus) tab 1 tablet, 1 tablet, Oral, Daily    glucose (Dex4) 15 GM/59ML oral liquid 15 g, 15 g, Oral, Q15 Min PRN **OR** glucose (Glutose) 40% oral gel 15 g, 15 g, Oral, Q15 Min PRN **OR** glucose-vitamin C (Dex-4) chewable tab 4 tablet, 4 tablet, Oral, Q15 Min PRN **OR** dextrose 50% injection 50 mL, 50 mL, Intravenous, Q15 Min PRN **OR** glucose (Dex4) 15 GM/59ML oral liquid 30 g, 30 g, Oral, Q15 Min PRN **OR** glucose (Glutose) 40% oral gel 30 g, 30 g, Oral, Q15 Min PRN **OR** glucose-vitamin C (Dex-4) chewable tab 8 tablet, 8 tablet, Oral, Q15 Min PRN    insulin aspart (NovoLOG) 100 Units/mL FlexPen 1-5 Units, 1-5 Units, Subcutaneous, TID AC and HS       EXAM:   BP 99/70 (BP Location: Left arm)   Pulse 70   Temp 98.4 °F (36.9 °C) (Oral)   Resp 16   Ht 6' (1.829 m)   Wt 145 lb (65.8 kg)   SpO2 98%   BMI 19.67 kg/m²  Body mass index is 19.67 kg/m².  Gen: cooperative, pleasant, not diaphoretic, nad   HEENT: ncat, normal neck ROM, normal op w/o ulcer/exudate, anicteric, mmm   Resp/Chest: normal respiratory effort, not dyspneic, no incr WOB/cough  CV: no reported palpitations or syncope  Abd: nt, nd, no clinical features suggestive of peritoneal s/s noted  Ext: no c/c/e   Skin: warm, perfused, no jaundice, no pallor  Neuro: aaox3, grossly intact, no asterixis noted, normal speech       LAB/IMAGING RESULTS:     Lab Results   Component Value Date    WBC 3.0 05/18/2024    HGB 12.3 05/18/2024    HCT 36.4 05/18/2024    PLT 60.0 05/18/2024     [unfilled]  Lab Results   Component Value Date    WBC 3.0 05/18/2024    HGB 12.3 05/18/2024    HCT 36.4 05/18/2024    PLT 60.0 05/18/2024    CREATSERUM 0.37 05/18/2024    BUN 1 05/18/2024     05/18/2024    K 4.3 05/18/2024    K 4.3 05/18/2024     05/18/2024    CO2 30.0 05/18/2024     05/18/2024    CA 8.5 05/18/2024    ALB 2.6 05/18/2024    ALKPHO 189 05/18/2024    BILT  2.3 05/18/2024    TP 5.7 05/18/2024     05/18/2024    ALT 61 05/18/2024    B12 1,823 05/18/2024    PGLU 221 05/18/2024     EGD + COL 5/16 (path pending)  EGD Findings:  - Long segment Perera's Esophagus (C9M10) extending from 26cm to 36cm at the GEJ. Extensively biopsied at the 3/2024 EGD. No overt stricture noted, may have resolved following dilation at 3/2024 EGD.  - Medium-sized hiatal hernia, extending from the GEJ at 36cm to 40cm from the incisors.  - Mild chronic-appearing erythema throughout the stomach, stomach otherwise normal. Biopsied from the body and antrum via cold forceps.  - Normal duodenum. Biopsied via cold forceps.     COL Findings:  - Copious brown opaque semi-liquid stool throughout the colon. Interferes with visualization.  - Small non-bleeding Grade 1/2 internal hemorrhoids.  - Two 14-18mm sessile polyps in the proximal ascending colon. Resected via hot snare.  - Moderate-to-severe active inflammation with focal nodularity predominantly in the cecum (cannot rule out possible flat adenomas within this inflammation in the cecal base), but with less active involvement in the proximal ascending colon. Cannot rule out recent infection vs ischemic insult. Limited biopsies obtained from the cecum via cold forceps, procedure maintained with low-insufflation and decompressed on withdrawal for safety.  - Colon otherwise normal in setting of poor bowel preparation. No large lesions noted.  - Normal terminal ileum.     CTA AP 5/16 (MRI/MRCP this admission w/ similar findings)  1. No significant stenosis or aneurysmal dilatation of the aorta or its major branches.      2. Fatty infiltration of the liver.      3. Small hiatal hernia.  Periesophageal lymph node measures 1.1 x 0.9 cm.      4.  Atrophic changes of pancreatic body and tail are noted.  Verify fat infiltration of the colonic wall may be sequelae of chronic inflammatory process.  No evidence of acute inflammatory process at the current  time.      5. Mild bladder wall thickening may be sequelae of infectious process.  This can also be sequelae of chronic obstruction.  Minimal free fluid is of unknown etiology.     ASSESSMENT AND PLAN:   #H/o prior acute pancreatitis, imaging features suggestive of pancreatic atrophy (likely chronic per EUS 3/2024)  #Perera's Esophagus  #Esophageal dysphagia, complicated strictures noted on EGD 3/2024  #Chronic tobacco dependence (advised to quit)  #H/o alcohol use (not currently)  #Family history of pancreatic CA    No further inpt GI evaluation required at this time, will need to f/u as outpt to address response to current measures + further care planning.  Continue nortriptyline 10mg PO at bedtime, does seem to be helping sleep + appetite, may help w/ more-chronic pain syndrome features over time if it can be adjusted.  No immediate indication for mirtazapine at this time.  Resume PERT this admission, may be challenging to continue as outpt if insurance proves difficult.  Continue PPI.  Continue work w/ Nutrition, some improvement in overall PO intake noted this admission w/ support.  Will sign off for now, call if questions arise.  Discussed w/ Hospitalist today.  Should f/u w/ [facility] (Dr. Montes) in the next 3-4wks following dc.    The patient indicates understanding of these issues and agrees to the plan.    A total of 25 minutes was spent in direct patient care + assessment, chart review, and care coordination today.    Gabriele Luis MD  Department of Gastroenterology  [facility]  5/18/2024  1:41 PM

## 2024-05-18 NOTE — PLAN OF CARE
Pt A&Ox4. Glasses and dentures at bedside. Room air, . Refusing tele monitoring. VSS. Afebrile. Lovenox on hold. Voids, up ad gretel. C/o of abdominal pain, prn/scheduled pain meds given per MAR. No c/o of sob, n/v/d. Carb controlled diet, QID accuchecks. Pt updated on poc. No further needs at this time. Safety precautions in place. Call light within reach.      Problem: Diabetes/Glucose Control  Goal: Glucose maintained within prescribed range  Description: INTERVENTIONS:  - Monitor Blood Glucose as ordered  - Assess for signs and symptoms of hyperglycemia and hypoglycemia  - Administer ordered medications to maintain glucose within target range  - Assess barriers to adequate nutritional intake and initiate nutrition consult as needed  - Instruct patient on self management of diabetes  Outcome: Progressing     Problem: Patient/Family Goals  Goal: Patient/Family Long Term Goal  Description: Patient's Long Term Goal: Discharge home    Interventions:  - See additional Care Plan goals for specific interventions  Outcome: Progressing  Goal: Patient/Family Short Term Goal  Description: Patient's Short Term Goal:  5/15 noc: get rest   5/16 AM: manage pain  5/16noc: sleep  5/17 NOC: sleep well, manage pain    Interventions:   - cluster care  - See additional Care Plan goals for specific interventions  Outcome: Progressing

## 2024-05-18 NOTE — PROGRESS NOTES
[facility]   part of [facility]    Progress Note     Cirilo Reyes Patient Status:  Inpatient    3/7/1965 MRN [id number]   Location [facility] 5NW-A Attending Ryan Betts MD   Hosp Day # 3 PCP Wil Wahl MD     Follow up for: The primary encounter diagnosis was Abdominal pain, generalized. Diagnoses of Hypokalemia, Dehydration, Anorexia, and ECG abnormal were also pertinent to this visit.      Interval History/Subjective:     NAEO   Afebrile, HDS  Labs, stool studies pending    Tolerating nortriptyline. Appetite improving. Very drowsy this morning, again c/o abdominal pain. Per RN has been preferentially requesting dilaudid     Vital signs:  Temp:  [97.4 °F (36.3 °C)-98.7 °F (37.1 °C)] 98.1 °F (36.7 °C)  Pulse:  [65-78] 68  Resp:  [14-16] 16  BP: ()/(72-78) 108/75  SpO2:  [96 %-98 %] 98 %    Physical Exam:    General: NAD, Comfortable, Nontoxic   Respiratory: CTAB; reg resp rate & effort, no wheezes/crackles  Cardiovascular: S1, S2. Regular rate and rhythm. No murmurs appreciated  Abdomen: Soft, mild RUQ TTP, no guarding/rebound   Neurologic: No focal neurological deficits.   Extremities: No edema.   Skin: Dry, no rashes, ulcers or lesions     Diagnostic Data:      Labs:  Recent Labs   Lab 24  1357 05/15/24  0708 24  0850 24  0717   WBC 7.3 5.8 4.3 3.0*   HGB 17.3 14.9 14.2 13.6   MCV 93.2 94.1 94.5 99.2   .0* 64.0* 69.0* 50.0*       Recent Labs   Lab 05/15/24  0708 24  0851 24  0717 24  1612   * 102* 142*  --    BUN 6* 3* 2*  --    CREATSERUM 0.46* 0.46* 0.44*  --    CA 7.9* 8.3* 8.0*  --    ALB 1.6* 2.8* 2.3*  --    * 134* 135*  --    K 3.5 2.8* 3.4* 3.8   CL 95* 94* 102  --    CO2 23.0 33.0* 27.0  --    ALKPHO 196* 179* 159*  --    AST 85* 100* 108*  --    ALT 51 44 42  --    BILT 2.5* 2.1* 2.0  --    TP 6.2* 6.1* 5.5*  --        No results for input(s): \"PTP\", \"INR\" in the last 168 hours.    No results for input(s): \"TROP\",  \"CK\" in the last 168 hours.         Imaging: Imaging data reviewed in Epic.    Medications:    pantoprazole  40 mg Oral QAM AC    nortriptyline  10 mg Oral Nightly    pregabalin  50 mg Oral TID    enoxaparin  40 mg Subcutaneous Daily    escitalopram  20 mg Oral Daily    multivitamin with minerals  1 tablet Oral Daily    insulin aspart  1-5 Units Subcutaneous TID AC and HS       ASSESSMENT / PLAN:       Cirilo Reyes Is a a 59 year old male who presents with dysphagia, progressive anorexia and severe (~25%) weight loss, found to have LFT elevation & atrophic pancreas on imaging      Problem List / Diagnoses     Dysphagia  History Esophageal Stricture  Anorexia  Weight Loss   Early Satiety   LFT Elevation / Pancreatic Atrophy   HERBERT (generalized anxiety disorder)  Major depression, recurrent, chronic (HCC)  Type 2 diabetes mellitus with hyperglycemia, without long-term current use of insulin (HCC)     Plan     Dysphagia  History Esophageal Stricture  Perera's  -- reports history esophageal stricture s/p dilation with temporary relief  -- follows with GI as outpatient, s/p EGD 3/2024 here with findings of 9cm circumferential Perera's Esophagus, multiple rings in proximal esophagus s/p dilation   -- GI consulted, plan discussed, EGD/Colonoscopy pending   -- iv famotidine BID while NPO      Anorexia  Weight Loss   Early Satiety   -- suspect secondary to chronic pancreatitis (below)  -- nutrition consult      LFT Elevation / Pancreatic Atrophy   Chronic Pancreatitis   -- Outpatient CT 11/2023 showed \"Abnormal appearance of the pancreas with focal atrophy of the mid pancreatic body and question of tiny pancreatic tail hypodensities. This may relate to sequela of pancreatitis and diabetes,\"   -- GI following per above, plan discussed   -- MRCP consistent with atrophic pancreas, no acute biliary pathology   -- EGD/CScope notable for inflammation of cecum, otherwise no significant pathology   -- Stool enzymes & studies  pending   -- resume pregabablin, start nortriptyline, reviewed with patient NSAIDs and opioids are not ideal options given   -- long conversation with patient about nature of his pain and risks of chronic opioid use at his age. Pt verbalized understanding. Stop dilaudid. Can continue norco prn for severe breakthrough but explained to patient he should not rely on it & to continue nortriptyline. If no improvement can consider follow up with outpatient pain      HERBERT (generalized anxiety disorder)  Major depression, recurrent, chronic (HCC)  -- holding home meds for now, resume lexapro following procedures      Type 2 diabetes mellitus with hyperglycemia, without long-term current use of insulin (HCC)  -- accuchecks, SSI-low     Acute On Chronic Thrombocytopenia  -- prev baseline 110-150  -- 119 on admission, dropped to 64 HD #1, suspect dilutional vs acute reactive in the setting of colitis noted on colonoscopy   -- has ranged 50-69 since  -- 4Ts =2, low probability of HIT, cont lovenox  -- hold dvt prophylaxis if Plt < 50, transfuse if develops bleeding <50 or plt count < 20   -- evaluated by Hematology 3/4 as outpatient, with suspicion for liver dysfunction as underlying etiology   -- at that visit Hematologist recommended consideration for bone marrow biopsy if TCP worsens  -- will consult Hem/Onc for further eval if continues to drop tomorrow     DVT Mechanical Prophylaxis:   SCDs,    DVT Pharmacologic Prophylaxis   Medication    enoxaparin (Lovenox) 40 MG/0.4ML SUBQ injection 40 mg              Code Status: FULL--default     Dispo: inpatient; HOWIE tomorrow pending stable labs     Plan of care discussed with patient and/or family at bedside.    KANDI Betts MD  [facility]   [phone]      This note was created using voice recognition technology. It may include inadvertent transcriptional errors. Any such errors should be contextually interpreted and should not be taken to alter the content or the  meaning.     Note to Patient: The 21st Century Cures Act makes medical notes like these available to patients in the interest of transparency. However, be advised this is a medical document. It is intended as peer to peer communication. It is written in medical language and may contain abbreviations or verbiage that are unfamiliar. It may appear blunt or direct. Medical documents are intended to carry relevant information, facts as evident, and the clinical opinion of the practitioner and not intended to be the primary source of your information.  Please refer directly to myself or clinical staff for information regarding plan of care.

## 2024-05-19 VITALS
BODY MASS INDEX: 19.64 KG/M2 | HEART RATE: 72 BPM | RESPIRATION RATE: 18 BRPM | WEIGHT: 145 LBS | OXYGEN SATURATION: 98 % | HEIGHT: 72 IN | DIASTOLIC BLOOD PRESSURE: 66 MMHG | SYSTOLIC BLOOD PRESSURE: 95 MMHG | TEMPERATURE: 99 F

## 2024-05-19 LAB
ALBUMIN SERPL-MCNC: 2.5 G/DL (ref 3.4–5)
ALBUMIN/GLOB SERPL: 0.8 {RATIO} (ref 1–2)
ALP LIVER SERPL-CCNC: 180 U/L
ALT SERPL-CCNC: 56 U/L
ANION GAP SERPL CALC-SCNC: 5 MMOL/L (ref 0–18)
AST SERPL-CCNC: 100 U/L (ref 15–37)
BASOPHILS # BLD AUTO: 0.03 X10(3) UL (ref 0–0.2)
BASOPHILS NFR BLD AUTO: 1 %
BILIRUB SERPL-MCNC: 2 MG/DL (ref 0.1–2)
BUN BLD-MCNC: 2 MG/DL (ref 9–23)
CALCIUM BLD-MCNC: 8.7 MG/DL (ref 8.5–10.1)
CHLORIDE SERPL-SCNC: 105 MMOL/L (ref 98–112)
CO2 SERPL-SCNC: 27 MMOL/L (ref 21–32)
CREAT BLD-MCNC: 0.3 MG/DL
EGFRCR SERPLBLD CKD-EPI 2021: 135 ML/MIN/1.73M2 (ref 60–?)
EOSINOPHIL # BLD AUTO: 0.06 X10(3) UL (ref 0–0.7)
EOSINOPHIL NFR BLD AUTO: 2.1 %
ERYTHROCYTE [DISTWIDTH] IN BLOOD BY AUTOMATED COUNT: 12.6 %
GLOBULIN PLAS-MCNC: 3.2 G/DL (ref 2.8–4.4)
GLUCOSE BLD-MCNC: 140 MG/DL (ref 70–99)
GLUCOSE BLD-MCNC: 157 MG/DL (ref 70–99)
GLUCOSE BLD-MCNC: 220 MG/DL (ref 70–99)
HCT VFR BLD AUTO: 37.1 %
HGB BLD-MCNC: 12.5 G/DL
IMM GRANULOCYTES # BLD AUTO: 0.01 X10(3) UL (ref 0–1)
IMM GRANULOCYTES NFR BLD: 0.3 %
LYMPHOCYTES # BLD AUTO: 0.56 X10(3) UL (ref 1–4)
LYMPHOCYTES NFR BLD AUTO: 19.5 %
MCH RBC QN AUTO: 33.7 PG (ref 26–34)
MCHC RBC AUTO-ENTMCNC: 33.7 G/DL (ref 31–37)
MCV RBC AUTO: 100 FL
MONOCYTES # BLD AUTO: 0.44 X10(3) UL (ref 0.1–1)
MONOCYTES NFR BLD AUTO: 15.3 %
NEUTROPHILS # BLD AUTO: 1.77 X10 (3) UL (ref 1.5–7.7)
NEUTROPHILS # BLD AUTO: 1.77 X10(3) UL (ref 1.5–7.7)
NEUTROPHILS NFR BLD AUTO: 61.8 %
OSMOLALITY SERPL CALC.SUM OF ELEC: 282 MOSM/KG (ref 275–295)
PLATELET # BLD AUTO: 67 10(3)UL (ref 150–450)
PLATELETS.RETICULATED NFR BLD AUTO: 6.4 % (ref 0–7)
POTASSIUM SERPL-SCNC: 4.7 MMOL/L (ref 3.5–5.1)
PROT SERPL-MCNC: 5.7 G/DL (ref 6.4–8.2)
RBC # BLD AUTO: 3.71 X10(6)UL
SODIUM SERPL-SCNC: 137 MMOL/L (ref 136–145)
WBC # BLD AUTO: 2.9 X10(3) UL (ref 4–11)

## 2024-05-19 PROCEDURE — 80053 COMPREHEN METABOLIC PANEL: CPT | Performed by: HOSPITALIST

## 2024-05-19 PROCEDURE — 82962 GLUCOSE BLOOD TEST: CPT

## 2024-05-19 PROCEDURE — 85025 COMPLETE CBC W/AUTO DIFF WBC: CPT | Performed by: HOSPITALIST

## 2024-05-19 RX ORDER — HYDROCODONE BITARTRATE AND ACETAMINOPHEN 5; 325 MG/1; MG/1
1 TABLET ORAL EVERY 8 HOURS PRN
Qty: 20 TABLET | Refills: 0 | Status: SHIPPED | OUTPATIENT
Start: 2024-05-19

## 2024-05-19 RX ORDER — NORTRIPTYLINE HYDROCHLORIDE 10 MG/1
10 CAPSULE ORAL NIGHTLY
Qty: 30 CAPSULE | Refills: 0 | Status: SHIPPED | OUTPATIENT
Start: 2024-05-19

## 2024-05-19 NOTE — PROGRESS NOTES
NURSING DISCHARGE NOTE    Discharged Home via Wheelchair.  Accompanied by Support staff  Belongings Returned to patient from safe.    PIV removed. Discharge education reviewed with pt. Updated on medication plans and follow up. No further questions on POC at this time.     Problem: Diabetes/Glucose Control  Goal: Glucose maintained within prescribed range  Description: INTERVENTIONS:  - Monitor Blood Glucose as ordered  - Assess for signs and symptoms of hyperglycemia and hypoglycemia  - Administer ordered medications to maintain glucose within target range  - Assess barriers to adequate nutritional intake and initiate nutrition consult as needed  - Instruct patient on self management of diabetes  Outcome: Progressing     Problem: Patient/Family Goals  Goal: Patient/Family Long Term Goal  Description: Patient's Long Term Goal: Discharge home    Interventions:  - See additional Care Plan goals for specific interventions  Outcome: Progressing  Goal: Patient/Family Short Term Goal  Description: Patient's Short Term Goal:  5/15 noc: get rest   5/16 AM: manage pain  5/16noc: sleep  5/17 NOC: sleep well, manage pain  5/18 noc: improve pain   5/19am: discharge home     Interventions:   - cluster care  - See additional Care Plan goals for specific interventions  Outcome: Progressing      Encounter Date: 2022       History     Chief Complaint   Patient presents with    Tachycardia     79-year-old female, history of COPD, hypertension, here for feeling weak today and checked heart rate was 180.  Patient reports that she was in her normal state of health today and then this afternoon she started to feel lightheaded, dizzy, weak.  She was also feeling shortness of breath.  Her friend came over and checked her heart rate and it was 100 and ED and said that is added to come to the ER.  Patient has no history of SVT, or any other arrhythmias.  She has had a recent cough, URI    The history is provided by the patient and a relative.   Review of patient's allergies indicates:   Allergen Reactions    Tylenol [acetaminophen]      Liver condition- advised not to take per MD     Past Medical History:   Diagnosis Date    Autoimmune hepatitis     Cataract     COPD (chronic obstructive pulmonary disease)     GERD (gastroesophageal reflux disease)     Hypertension      Past Surgical History:   Procedure Laterality Date    BELPHAROPTOSIS REPAIR      CATARACT EXTRACTION      CHOLECYSTECTOMY      HYSTERECTOMY      LUMBAR DISCECTOMY      SURGICAL REMOVAL OF BONE SPUR      right foot    TONSILLECTOMY       Family History   Problem Relation Age of Onset    Ovarian cancer Maternal Aunt      Social History     Tobacco Use    Smoking status: Former     Types: Cigarettes     Quit date: 2013     Years since quittin.2    Smokeless tobacco: Never   Substance Use Topics    Alcohol use: Yes     Comment: occasionally    Drug use: Never     Review of Systems   Constitutional:  Positive for fatigue. Negative for chills, diaphoresis and fever.   Respiratory:  Positive for chest tightness and shortness of breath.    Cardiovascular:  Positive for chest pain and leg swelling. Negative for palpitations.   Gastrointestinal:  Negative for abdominal pain.   Neurological:  Positive for dizziness and light-headedness. Negative  for seizures, syncope and headaches.   All other systems reviewed and are negative.    Physical Exam     Initial Vitals [11/02/22 1754]   BP Pulse Resp Temp SpO2   (!) 90/50 (!) 180 18 97.3 °F (36.3 °C) 97 %      MAP       --         Physical Exam    Nursing note and vitals reviewed.  Constitutional: Vital signs are normal. She appears well-developed and well-nourished. She is not diaphoretic.  Non-toxic appearance. She does not appear ill. No distress.   HENT:   Head: Normocephalic and atraumatic.   Mouth/Throat: Mucous membranes are normal. Mucous membranes are not dry.   Eyes: Conjunctivae and lids are normal.   Neck: Neck supple.   Normal range of motion.  Cardiovascular:  Regular rhythm.   Tachycardia present.         Pulmonary/Chest: Breath sounds normal. No respiratory distress.   Abdominal: Abdomen is soft. She exhibits no distension. There is no rebound.   Musculoskeletal:         General: No tenderness or edema. Normal range of motion.      Cervical back: Normal range of motion and neck supple.     Neurological: She is alert and oriented to person, place, and time. She has normal strength.   Skin: Skin is dry and intact. No pallor.   Psychiatric: She has a normal mood and affect. Her speech is normal and behavior is normal.       ED Course   Procedures  Labs Reviewed   TROPONIN I - Abnormal; Notable for the following components:       Result Value    Troponin I 0.027 (*)     All other components within normal limits   D DIMER, QUANTITATIVE - Abnormal; Notable for the following components:    D-Dimer 1.28 (*)     All other components within normal limits   COMPREHENSIVE METABOLIC PANEL - Abnormal; Notable for the following components:    CO2 22 (*)     Total Bilirubin 1.5 (*)     All other components within normal limits   CBC W/ AUTO DIFFERENTIAL - Abnormal; Notable for the following components:    RBC 3.64 (*)     Hematocrit 35.8 (*)     MCH 34.3 (*)     RDW 15.5 (*)     Platelets 121 (*)     All other  components within normal limits   TSH   MAGNESIUM   HIV 1 / 2 ANTIBODY   HEPATITIS C ANTIBODY   ISTAT PROCEDURE     EKG Readings: (Independently Interpreted)   Tachycardia, wide complex, regular  SVT with aberrancy versus V-tach    Repeat EKG sinus tach with ST depressions in the inferior lateral leads     Imaging Results              CTA Chest Non-Coronary (PE Studies) (Final result)  Result time 11/02/22 22:16:27      Final result by Hi Sosa MD (11/02/22 22:16:27)                   Impression:      1. No evidence of pulmonary embolism.  2. Cirrhosis of the liver.  Recommend clinical correlation.  3. Nondistention of the stomach.  Mild wall thickening and gastritis are considerations.  Recommend clinical correlation.  4. Small hiatal hernia.  5. Mild atelectasis in the right middle and right lower lobes.  6. Mild emphysematous changes of the lungs.      Electronically signed by: Hi Sosa  Date:    11/02/2022  Time:    22:16               Narrative:    EXAMINATION:  CTA CHEST NON CORONARY (PE STUDIES)    CLINICAL HISTORY:  Pulmonary embolism (PE) suspected, positive D-dimer;    TECHNIQUE:  Low dose axial images, sagittal and coronal reformations were obtained from the thoracic inlet to the lung bases following the IV administration of 100 mL of Omnipaque 350.  Contrast timing was optimized to evaluate the pulmonary arteries.  MIP images were performed.    COMPARISON:  None    FINDINGS:  Pulmonary arteries:Pulmonary arteries are adequately enhanced.No filling defects to indicate pulmonary embolism.    Thoracic soft tissues: Unremarkable.    Aorta: Left-sided aortic arch.  No aneurysm or dissection.    Heart: Normal size. No effusion.    Mary/Mediastinum: No pathologic carol ann enlargement.    Airways: Patent.    Lungs/Pleura: Clear lungs. No pleural effusion or thickening.  Mild atelectasis in the right middle and right lower lobes.  Mild emphysematous changes throughout the lungs.    Small hiatal  hernia.    Esophagus: Unremarkable.    Upper Abdomen: Cirrhotic changes of the liver.  No focal abnormality.    Nondistention of the stomach.  Mild wall thickening and gastritis is a consideration.    Bones: No acute fracture. No suspicious lytic or sclerotic lesions.                                       X-Ray Chest AP Portable (Final result)  Result time 11/02/22 19:03:36      Final result by Darnell Ocasio DO (11/02/22 19:03:36)                   Impression:      No acute abnormality.      Electronically signed by: Darnell Ocasio  Date:    11/02/2022  Time:    19:03               Narrative:    EXAMINATION:  XR CHEST AP PORTABLE    CLINICAL HISTORY:  svt;    TECHNIQUE:  Single frontal view of the chest was performed.    COMPARISON:  None    FINDINGS:  The lungs are well expanded and clear. No focal opacities are seen. The pleural spaces are clear.    The cardiac silhouette is unremarkable.    The visualized osseous structures are unremarkable.                                       Medications   sodium chloride 0.9% flush 10 mL (has no administration in time range)   melatonin tablet 6 mg (has no administration in time range)   vitamin D 1000 units tablet 2,000 Units (has no administration in time range)   pantoprazole EC tablet 40 mg (has no administration in time range)   tiotropium bromide 2.5 mcg/actuation inhaler 2 puff (has no administration in time range)   azaTHIOprine tablet 150 mg (has no administration in time range)   albuterol-ipratropium 2.5 mg-0.5 mg/3 mL nebulizer solution 3 mL (has no administration in time range)   glucose chewable tablet 16 g (has no administration in time range)   glucose chewable tablet 24 g (has no administration in time range)   glucagon (human recombinant) injection 1 mg (has no administration in time range)   dextrose 10% bolus 125 mL (has no administration in time range)   dextrose 10% bolus 250 mL (has no administration in time range)   losartan tablet 100 mg (has no  administration in time range)   aspirin EC tablet 81 mg (has no administration in time range)   lactated ringers bolus 500 mL (0 mLs Intravenous Stopped 11/2/22 2020)   iohexoL (OMNIPAQUE 350) injection 100 mL (100 mLs Intravenous Given 11/2/22 2053)   aspirin tablet 325 mg (325 mg Oral Given 11/2/22 2252)   magnesium sulfate in dextrose IVPB (premix) 1 g (0 g Intravenous Stopped 11/3/22 0122)   magnesium sulfate in dextrose IVPB (premix) 1 g (0 g Intravenous Stopped 11/3/22 0445)     Medical Decision Making:   History:   Old Medical Records: I decided to obtain old medical records.  Initial Assessment:   Patient with shortness of breath, palpitations, initial EKG shows a wide complex tachycardia that is regular, most likely SVT with aberrancy but V-tach a possibility, less likely    On my arrival to the room to evaluate the patient she had just spontaneously converted to a sinus tachycardia    Differential Diagnosis:   SVT with aberrancy versus V-tach, less likely  PE  ACS  Clinical Tests:   Lab Tests: Ordered and Reviewed  Radiological Study: Reviewed and Ordered  Medical Tests: Ordered and Reviewed  ED Management:  Labs including troponin and D-dimer  Cardiac monitor  Chest x-ray  IV fluids  Reassess    10:36 PM  CTA negative for PE.  Troponin mildly positive and patient does have ST depressions in the inferior lateral leads that are concerning.  She did have a negative stress test in September but given her age, comorbidities, elevated troponin abnormal EKG I would feel more comfortable with her being admitted overnight for observation for serial troponins and further evaluation, cardiac monitoring.                        Clinical Impression:   Final diagnoses:  [R00.0] Tachycardia  [I47.1] SVT (supraventricular tachycardia) (Primary)        ED Disposition Condition    Observation Stable                Malathi Pruitt MD  11/03/22 9947

## 2024-05-19 NOTE — CM/SW NOTE
Notified by RN pt is medically cleared for discharge and will need transportation home. Per previous notes, pt had indicated he has transportation issues and relies on his friend (Teresa) for transportation. VIC inquired if pt's friend can pick him up at discharge. RN stated pt informed her his friend cannot pick him up today. RN stated when asked pt if his friend can come sometime tonight, pt stated 'the earliest she can come is midnight.' SW inquired address of motel pt is staying at, PT note indicated pt is staying a motel. RN stated address on Facesheet is for a [place] residence and pt lives with his mother. Per OT note, pt reports no acute concerns with ADLs and functional transfers. Per RN pt is up ad gretel. Pt would not qualify for a Medicar.    VIC inquired if pt's mother can pay for transport, RN uncertain. Pt's mother is not listed on pt's Facesheet. RN stated pt has no transportation home and no money for an uber. RN stated pt is medically cleared for discharge. Per previous SW note, pt lives with his mother in [place].     VIC called [facility] [phone] and spoke with Ashlyn to arrange a Lyft. Ashlyn stated to have RN bring pt down to [facility]. Updated RN.    [address] [place] [address]    3:30pm - VIC received call from from Luh at [facility] stating pt has provided a different address, stating the [place] address is for his mother. Luh stated pt provided an address in [place] and approval is needed to change destination. Pt already off unit and has been discharged. VIC informed Luh she can change destination. Per online search, address pt provided to [facility] is for a motel.     [facility]  [address] [place] [address]    LINDA Humphrey  Discharge Planner

## 2024-05-19 NOTE — DISCHARGE SUMMARY
General Medicine Discharge Summary     Patient ID:  Cirilo Reyes  59 year old  3/7/1965    Admit date: 5/14/2024    Discharge date and time: 5/19/2024     Attending Physician: No att. providers found     Primary Care Physician: Wil Wahl MD     Discharge Diagnoses: Anorexia [R63.0]  Abdominal pain, generalized [R10.84]  Dehydration [E86.0]  Hypokalemia [E87.6]  ECG abnormal [R94.31]    Primary Diagnosis at discharge from Hospital: Other: chronic pancreatitis; still recommend for TCM follow-up    Please note that only [facility] and [facility] patients enrolled in the Medicare ACO, [facility] ACO and [facility] HMOs will be handled by the [facility] Care Management team.  For all other patients, please follow usual protocol for discharge care transition.    Secondary Diagnoses:    Dysphagia  History Esophageal Stricture  Anorexia  Weight Loss   Early Satiety   LFT Elevation / Pancreatic Atrophy   HERBERT (generalized anxiety disorder)  Major depression, recurrent, chronic (HCC)  Type 2 diabetes mellitus with hyperglycemia, without long-term current use of insulin (HCC)    Risk of readmission: Cirilo Reyes has High Risk of readmission after discharge from the hospital.    Discharge Condition: stable    Disposition: home      Important Follow up:  - PCP within   - Consults:     Wil Wahl MD  [address]  [facility] [address] [phone]    Schedule an appointment as soon as possible for a visit in 1 week(s)  Post hospitalization follow up    Cheng Montes MD  [address]  [facility] [address] [phone]    Schedule an appointment as soon as possible for a visit in 3 week(s)  Follow up in 3-4 weeks for review of studies, discussion of pancreatic enzymes    - Labs: n/a  - Radiology: n/a    Hospital Course:      For further details, please see daily progress notes. In brief,      Cirilo Reyes Is a a 59 year old male who presents with dysphagia, progressive  anorexia and severe (~25%) weight loss, found to have LFT elevation & atrophic pancreas on imaging      Problem List / Diagnoses     Dysphagia  History Esophageal Stricture  Anorexia  Weight Loss   Early Satiety   LFT Elevation / Pancreatic Atrophy   HERBERT (generalized anxiety disorder)  Major depression, recurrent, chronic (HCC)  Type 2 diabetes mellitus with hyperglycemia, without long-term current use of insulin (HCC)     Plan     Dysphagia  History Esophageal Stricture  Perera's  -- reports history esophageal stricture s/p dilation with temporary relief  -- follows with GI as outpatient, s/p EGD 3/2024 here with findings of 9cm circumferential Perera's Esophagus, multiple rings in proximal esophagus s/p dilation   -- GI consulted, plan discussed, EGD/Colonoscopy pending   -- iv famotidine BID while NPO      Anorexia  Weight Loss   Early Satiety   -- suspect secondary to chronic pancreatitis (below)  -- nutrition consult      LFT Elevation / Pancreatic Atrophy   Chronic Pancreatitis   -- Outpatient CT 11/2023 showed \"Abnormal appearance of the pancreas with focal atrophy of the mid pancreatic body and question of tiny pancreatic tail hypodensities. This may relate to sequela of pancreatitis and diabetes,\"   -- GI following per above, plan discussed   -- MRCP consistent with atrophic pancreas, no acute biliary pathology   -- EGD/CScope notable for inflammation of cecum, otherwise no significant pathology   -- Stool enzymes & studies pending   -- resume pregabablin, start nortriptyline, reviewed with patient NSAIDs and opioids are not ideal options given   -- long conversation with patient about nature of his pain and risks of chronic opioid use at his age. Pt verbalized understanding. Stop dilaudid. Can continue norco prn for severe breakthrough but explained to patient he should not rely on it & to continue nortriptyline. If no improvement can consider follow up with outpatient pain      HERBERT (generalized anxiety  disorder)  Major depression, recurrent, chronic (HCC)  -- resume home meds      Type 2 diabetes mellitus with hyperglycemia, without long-term current use of insulin (HCC)  -- accuchecks, SSI-low   -- resume oral meds on discharge      Acute On Chronic Thrombocytopenia  -- prev baseline 110-150  -- 119 on admission, dropped to 64 HD #1, likely dilutional vs acute reactive in the setting of colitis noted on colonoscopy   -- has ranged 50-69 since  -- 4Ts =2, low probability of HIT, cont lovenox  -- hold dvt prophylaxis if Plt < 50, transfuse if develops bleeding <50 or plt count < 20   -- evaluated by Hematology 3/4 as outpatient, with suspicion for liver dysfunction as underlying etiology   --remained stable following cessation of IVF, pt instructed to follow up with his Hematologist    Consults: NURSING CONSULT TO DIETITIAN  IP CONSULT TO SOCIAL WORK  IP CONSULT TO SPIRITUAL CARE  IP CONSULT TO SOCIAL WORK    Operative Procedures: Procedure(s) (LRB):  ESOPHAGOGASTRODUODENOSCOPY (EGD) W/ BIOPSIES / COLONOSCOPY W/ HOT SNARE POLYPECTOMY & BIOPSIES (N/A)  COLONOSCOPY (N/A)       Patient instructions:      All medications personally reviewed and reconciled on day of discharge.     Discharge Medication List as of 5/19/2024  3:08 PM        START taking these medications    Details   HYDROcodone-acetaminophen 5-325 MG Oral Tab Take 1 tablet by mouth every 8 (eight) hours as needed (for severe pain)., Normal, Disp-20 tablet, R-0      nortriptyline 10 MG Oral Cap Take 1 capsule (10 mg total) by mouth nightly., Normal, Disp-30 capsule, R-0      potassium chloride 20 MEQ Oral Tab CR Take 1 tablet (20 mEq total) by mouth 2 (two) times daily for 10 days., Normal, Disp-20 tablet, R-0           CONTINUE these medications which have NOT CHANGED    Details   potassium chloride 20 MEQ Oral Powd Pack Take 20 mEq by mouth 2 (two) times daily., Historical      furosemide 20 MG Oral Tab Take 1 tablet (20 mg total) by mouth daily.,  Historical      insulin glargine (LANTUS SOLOSTAR) 100 UNIT/ML Subcutaneous Solution Pen-injector Historical      metFORMIN HCl ER, OSM, 500 MG (OSM) Oral Tablet 24 Hr Take 1 tablet (500 mg total) by mouth daily with breakfast., Historical      glyBURIDE 2.5 MG Oral Tab Take 2 mg by mouth daily with breakfast., Historical      escitalopram 10 MG Oral Tab Take 1 tablet (10 mg total) by mouth daily., Historical      pantoprazole 40 MG Oral Tab EC Take 1 tablet (40 mg total) by mouth every morning before breakfast., Historical      gabapentin 300 MG Oral Cap Take 1 capsule (300 mg total) by mouth 3 (three) times daily., Historical             Activity: activity as tolerated  Diet:  low fat, no alcohol, small meal volumes  Wound Care: as directed  Code Status: No Order      Exam on day of discharge:     Vitals:    05/19/24 1120   BP: 95/66   Pulse: 72   Resp: 18   Temp: 98.6 °F (37 °C)       General: nad, comfortable, nontoxic  but chronically ill appearing   Heart: RRR, no murmurs appreciated   Lungs: clear bilaterally, reg resp rate & effort, no wheezes/crackles   Abdomen: soft, mild RUQ TTP, stable, no guarding/rebound tenderness  Extremities: WWP, no LIZET   Neuro: CN inact, no focal deficits      Total time coordinating care for discharge: 35 minutes    KANDI Betts MD  [facility] Hospitalist  [phone]

## 2024-05-19 NOTE — PLAN OF CARE
Problem:  Abdominal pain   Data: Ax04. Refusing telemetry . Intermittent pulse ox. Room air. Afebrile. Verbalizing abdominal pain. Scheduled medication given. No n/v/d. Tolerated dinner. 1800 ADA diet. Accu checks. Saline lock. No BM overnight. Up at gretel.   Intervention: nortriptyline, lyrica, norco  Education: pain medication and frequency.   Response: cooperative with care         Problem: Diabetes/Glucose Control  Goal: Glucose maintained within prescribed range  Description: INTERVENTIONS:  - Monitor Blood Glucose as ordered  - Assess for signs and symptoms of hyperglycemia and hypoglycemia  - Administer ordered medications to maintain glucose within target range  - Assess barriers to adequate nutritional intake and initiate nutrition consult as needed  - Instruct patient on self management of diabetes  Outcome: Progressing     Problem: Patient/Family Goals  Goal: Patient/Family Long Term Goal  Description: Patient's Long Term Goal: Discharge home    Interventions:  - See additional Care Plan goals for specific interventions  Outcome: Progressing  Goal: Patient/Family Short Term Goal  Description: Patient's Short Term Goal:  5/15 noc: get rest   5/16 AM: manage pain  5/16noc: sleep  5/17 NOC: sleep well, manage pain  5/18 noc: improve pain     Interventions:   - cluster care  - See additional Care Plan goals for specific interventions  Outcome: Progressing

## 2024-05-20 NOTE — PAYOR COMM NOTE
--------------  CONTINUED STAY REVIEW    Payor: ROSARIO BARTHOLOMEW  Subscriber #:  [id number]  Authorization Number: [id number]    Admit date: 5/15/24  Admit time: 12:53 AM    5/16 5/16/2024  3:31 PM ESOPHAGOGASTRODUODENOSCOPY (EGD) W/ BIOPSIES / COLONOSCOPY W/ HOT SNARE POLYPECTOMY & BIOPSIES   COLONOSCOPY        5/17    Interval History/Subjective:      EGD/Cscope yesterday with poor prep; mod-sev inflammation primarily in cecum, long segment of Perera's redemonstrated   CTA Abd/Pelvis negative for evidence of mesenteric ischemia      ANIRUDH overnight  Afebrile, HDS     Appetite improving, still with intermittent cramping abdominal pain     Vital signs:  Temp:  [97.4 °F (36.3 °C)-98.4 °F (36.9 °C)] 97.4 °F (36.3 °C)  Pulse:  [60-80] 78  Resp:  [10-16] 14  BP: ()/(69-87) 109/78  SpO2:  [96 %-98 %] 96 %     Physical Exam:    General: NAD, Comfortable, Nontoxic   Respiratory: CTAB; reg resp rate & effort, no wheezes/crackles  Cardiovascular: S1, S2. Regular rate and rhythm. No murmurs appreciated  Abdomen: Soft, mild RUQ TTP, no guarding/rebound   Neurologic: No focal neurological deficits.   Extremities: No edema.   Skin: Dry, no rashes, ulcers or lesions      Diagnostic Data:       Labs:         Recent Labs   Lab 05/14/24  1357 05/15/24  0708 05/16/24  0850 05/17/24  0717   WBC 7.3 5.8 4.3 3.0*   HGB 17.3 14.9 14.2 13.6   MCV 93.2 94.1 94.5 99.2   .0* 64.0* 69.0* 50.0*               Recent Labs   Lab 05/15/24  0708 05/16/24  0851 05/17/24  0717   * 102* 142*   BUN 6* 3* 2*   CREATSERUM 0.46* 0.46* 0.44*   CA 7.9* 8.3* 8.0*   ALB 1.6* 2.8* 2.3*   * 134* 135*   K 3.5 2.8* 3.4*   CL 95* 94* 102   CO2 23.0 33.0* 27.0   ALKPHO 196* 179* 159*   AST 85* 100* 108*   ALT 51 44 42   BILT 2.5* 2.1* 2.0   TP 6.2* 6.1* 5.5*         No results for input(s): \"PTP\", \"INR\" in the last 168 hours.     No results for input(s): \"TROP\", \"CK\" in the last 168 hours.        Imaging: Imaging data reviewed in Epic.      Medications:   Scheduled Medications    potassium chloride  40 mEq Oral Q4H    nortriptyline  10 mg Oral Nightly    pregabalin  50 mg Oral TID    enoxaparin  40 mg Subcutaneous Daily    escitalopram  20 mg Oral Daily    famotidine  20 mg Intravenous BID    multivitamin with minerals  1 tablet Oral Daily    insulin aspart  1-5 Units Subcutaneous TID AC and HS            ASSESSMENT / PLAN:         Cirilo Reyes Is a a 59 year old male who presents with dysphagia, progressive anorexia and severe (~25%) weight loss, found to have LFT elevation & atrophic pancreas on imaging      Problem List / Diagnoses     Dysphagia  History Esophageal Stricture  Anorexia  Weight Loss   Early Satiety   LFT Elevation / Pancreatic Atrophy   HERBERT (generalized anxiety disorder)  Major depression, recurrent, chronic (HCC)  Type 2 diabetes mellitus with hyperglycemia, without long-term current use of insulin (HCC)     Plan     Dysphagia  History Esophageal Stricture  Perera's  -- reports history esophageal stricture s/p dilation with temporary relief  -- follows with GI as outpatient, s/p EGD 3/2024 here with findings of 9cm circumferential Perera's Esophagus, multiple rings in proximal esophagus s/p dilation   -- GI consulted, plan discussed, EGD/Colonoscopy pending   -- iv famotidine BID while NPO      Anorexia  Weight Loss   Early Satiety   -- suspect secondary to chronic pancreatitis (below)  -- nutrition consult      LFT Elevation / Pancreatic Atrophy   Chronic Pancreatitis   -- Outpatient CT 11/2023 showed \"Abnormal appearance of the pancreas with focal atrophy of the mid pancreatic body and question of tiny pancreatic tail hypodensities. This may relate to sequela of pancreatitis and diabetes,\"   -- GI following per above, plan discussed   -- MRCP consistent with atrophic pancreas, no acute biliary pathology   -- EGD/CScope notable for inflammation of cecum, otherwise no significant pathology   -- Stool enzymes & studies pending    -- resume pregabablin, start nortriptyline, reviewed with patient NSAIDs and opioids are not ideal options given      HERBERT (generalized anxiety disorder)  Major depression, recurrent, chronic (HCC)  -- holding home meds for now, resume lexapro following procedures      Type 2 diabetes mellitus with hyperglycemia, without long-term current use of insulin (HCC)  -- accuchecks, SSI-low      Acute On Chronic Thrombocytopenia  -- prev baseline 110-150  -- 119 on admission, dropped to 64 HD #1, suspect dilutional vs acute reactive in the setting of colitis noted on colonoscopy   -- has ranged 50-69 since  -- 4Ts =2, low probability of HIT, cont lovenox  -- hold dvt prophylaxis if Plt < 50, transfuse if develops bleeding <50 or plt count < 20   -- evaluated by Hematology 3/4 as outpatient, with suspicion for liver dysfunction as underlying etiology   -- at that visit Hematologist recommended consideration for bone marrow biopsy if TCP worsens  -- will consult Hem/Onc for further eval if continues to drop tomorrow      DVT Mechanical Prophylaxis:   SCDs,        DVT Pharmacologic Prophylaxis   Medication    enoxaparin (Lovenox) 40 MG/0.4ML SUBQ injection 40 mg               Code Status: FULL--default      Dispo: inpatient; HOWIE 1-2 days pending clinical improvement, results of stool studies, improved platelets     Plan of care discussed with patient and/or family at bedside.     KANDI Betts MD  [facility]     5/18    S: NAEO. Eating better as admission goes on, tolerating low-dose TCA at bedtime which may be helping sleep + some component of appetite.     PROBLEM LIST:          Patient Active Problem List   Diagnosis    Abdominal pain, generalized    Hypokalemia    Dehydration    Anorexia    ECG abnormal         Medications:    Current Hospital Medications      Current Facility-Administered Medications:     pantoprazole (Protonix) DR tab 40 mg, 40 mg, Oral, QAM AC    HYDROmorphone (Dilaudid) 1 MG/ML injection  0.1 mg, 0.1 mg, Intravenous, Q2H PRN **OR** HYDROmorphone (Dilaudid) 1 MG/ML injection 0.2 mg, 0.2 mg, Intravenous, Q2H PRN **OR** HYDROmorphone (Dilaudid) 1 MG/ML injection 0.4 mg, 0.4 mg, Intravenous, Q2H PRN    nortriptyline (Pamelor) cap 10 mg, 10 mg, Oral, Nightly    pregabalin (Lyrica) cap 50 mg, 50 mg, Oral, TID    enoxaparin (Lovenox) 40 MG/0.4ML SUBQ injection 40 mg, 40 mg, Subcutaneous, Daily    polyethylene glycol (PEG 3350) (Miralax) 17 g oral packet 17 g, 17 g, Oral, Daily PRN    sennosides (Senokot) tab 17.2 mg, 17.2 mg, Oral, Nightly PRN    bisacodyl (Dulcolax) 10 MG rectal suppository 10 mg, 10 mg, Rectal, Daily PRN    fleet enema (Fleet) 7-19 GM/118ML rectal enema 133 mL, 1 enema, Rectal, Once PRN    ondansetron (Zofran) 4 MG/2ML injection 4 mg, 4 mg, Intravenous, Q6H PRN    prochlorperazine (Compazine) 10 MG/2ML injection 5 mg, 5 mg, Intravenous, Q8H PRN    melatonin tab 3 mg, 3 mg, Oral, Nightly PRN    acetaminophen (Tylenol) tab 650 mg, 650 mg, Oral, Q4H PRN **OR** HYDROcodone-acetaminophen (Norco) 5-325 MG per tab 1 tablet, 1 tablet, Oral, Q4H PRN **OR** HYDROcodone-acetaminophen (Norco) 5-325 MG per tab 2 tablet, 2 tablet, Oral, Q4H PRN    acetaminophen (Tylenol Extra Strength) tab 500 mg, 500 mg, Oral, Q4H PRN    escitalopram (Lexapro) tab 20 mg, 20 mg, Oral, Daily    artificial saliva substitute (Biotene) oral solution, , Oral, PRN    multivitamin with minerals (Thera M Plus) tab 1 tablet, 1 tablet, Oral, Daily    glucose (Dex4) 15 GM/59ML oral liquid 15 g, 15 g, Oral, Q15 Min PRN **OR** glucose (Glutose) 40% oral gel 15 g, 15 g, Oral, Q15 Min PRN **OR** glucose-vitamin C (Dex-4) chewable tab 4 tablet, 4 tablet, Oral, Q15 Min PRN **OR** dextrose 50% injection 50 mL, 50 mL, Intravenous, Q15 Min PRN **OR** glucose (Dex4) 15 GM/59ML oral liquid 30 g, 30 g, Oral, Q15 Min PRN **OR** glucose (Glutose) 40% oral gel 30 g, 30 g, Oral, Q15 Min PRN **OR** glucose-vitamin C (Dex-4) chewable tab 8 tablet,  8 tablet, Oral, Q15 Min PRN    insulin aspart (NovoLOG) 100 Units/mL FlexPen 1-5 Units, 1-5 Units, Subcutaneous, TID AC and HS            EXAM:   BP 99/70 (BP Location: Left arm)   Pulse 70   Temp 98.4 °F (36.9 °C) (Oral)   Resp 16   Ht 6' (1.829 m)   Wt 145 lb (65.8 kg)   SpO2 98%   BMI 19.67 kg/m²  Body mass index is 19.67 kg/m².  Gen: cooperative, pleasant, not diaphoretic, nad   HEENT: ncat, normal neck ROM, normal op w/o ulcer/exudate, anicteric, mmm   Resp/Chest: normal respiratory effort, not dyspneic, no incr WOB/cough  CV: no reported palpitations or syncope  Abd: nt, nd, no clinical features suggestive of peritoneal s/s noted  Ext: no c/c/e   Skin: warm, perfused, no jaundice, no pallor  Neuro: aaox3, grossly intact, no asterixis noted, normal speech         LAB/IMAGING RESULTS:            Lab Results   Component Value Date     WBC 3.0 05/18/2024     HGB 12.3 05/18/2024     HCT 36.4 05/18/2024     PLT 60.0 05/18/2024      [unfilled]        Lab Results   Component Value Date     WBC 3.0 05/18/2024     HGB 12.3 05/18/2024     HCT 36.4 05/18/2024     PLT 60.0 05/18/2024     CREATSERUM 0.37 05/18/2024     BUN 1 05/18/2024      05/18/2024     K 4.3 05/18/2024     K 4.3 05/18/2024      05/18/2024     CO2 30.0 05/18/2024      05/18/2024     CA 8.5 05/18/2024     ALB 2.6 05/18/2024     ALKPHO 189 05/18/2024     BILT 2.3 05/18/2024     TP 5.7 05/18/2024      05/18/2024     ALT 61 05/18/2024     B12 1,823 05/18/2024     PGLU 221 05/18/2024              ASSESSMENT AND PLAN:   #H/o prior acute pancreatitis, imaging features suggestive of pancreatic atrophy (likely chronic per EUS 3/2024)  #Perera's Esophagus  #Esophageal dysphagia, complicated strictures noted on EGD 3/2024  #Chronic tobacco dependence (advised to quit)  #H/o alcohol use (not currently)  #Family history of pancreatic CA     No further inpt GI evaluation required at this time, will need to f/u as outpt to address  response to current measures + further care planning.  Continue nortriptyline 10mg PO at bedtime, does seem to be helping sleep + appetite, may help w/ more-chronic pain syndrome features over time if it can be adjusted.  No immediate indication for mirtazapine at this time.  Resume PERT this admission, may be challenging to continue as outpt if insurance proves difficult.  Continue PPI.  Continue work w/ Nutrition, some improvement in overall PO intake noted this admission w/ support.  Will sign off for now, call if questions arise.  Discussed w/ Hospitalist today.  Should f/u w/ [facility] (Dr. Montes) in the next 3-4wks following dc.     The patient indicates understanding of these issues and agrees to the plan.     A total of 25 minutes was spent in direct patient care + assessment, chart review, and care coordination today.     Gabriele Luis MD  Department of Gastroenterology  [facility]  5/18/2024  1:41 PM               Electronically signed by Gabriele Luis MD at 5/18/2024  1:47 PM

## 2024-05-22 LAB — VITAMIN A: 3.2 UG/DL

## 2024-07-01 ENCOUNTER — ANESTHESIA EVENT (OUTPATIENT)
Dept: ENDOSCOPY | Facility: HOSPITAL | Age: 59
End: 2024-07-01
Payer: COMMERCIAL

## 2024-07-02 ENCOUNTER — ANESTHESIA (OUTPATIENT)
Dept: ENDOSCOPY | Facility: HOSPITAL | Age: 59
End: 2024-07-02
Payer: COMMERCIAL

## 2024-07-02 ENCOUNTER — HOSPITAL ENCOUNTER (OUTPATIENT)
Facility: HOSPITAL | Age: 59
Setting detail: HOSPITAL OUTPATIENT SURGERY
Discharge: HOME | End: 2024-07-02
Attending: INTERNAL MEDICINE | Admitting: INTERNAL MEDICINE
Payer: COMMERCIAL

## 2024-07-02 VITALS
HEIGHT: 72 IN | OXYGEN SATURATION: 100 % | TEMPERATURE: 97 F | DIASTOLIC BLOOD PRESSURE: 87 MMHG | SYSTOLIC BLOOD PRESSURE: 124 MMHG | WEIGHT: 145 LBS | BODY MASS INDEX: 19.64 KG/M2 | RESPIRATION RATE: 16 BRPM | HEART RATE: 70 BPM

## 2024-07-02 LAB — GLUCOSE BLD-MCNC: 168 MG/DL (ref 70–99)

## 2024-07-02 PROCEDURE — 3E0H8GC INTRODUCTION OF OTHER THERAPEUTIC SUBSTANCE INTO LOWER GI, VIA NATURAL OR ARTIFICIAL OPENING ENDOSCOPIC: ICD-10-PCS | Performed by: INTERNAL MEDICINE

## 2024-07-02 PROCEDURE — 0DBK8ZX EXCISION OF ASCENDING COLON, VIA NATURAL OR ARTIFICIAL OPENING ENDOSCOPIC, DIAGNOSTIC: ICD-10-PCS | Performed by: INTERNAL MEDICINE

## 2024-07-02 PROCEDURE — 82962 GLUCOSE BLOOD TEST: CPT

## 2024-07-02 PROCEDURE — 88305 TISSUE EXAM BY PATHOLOGIST: CPT | Performed by: INTERNAL MEDICINE

## 2024-07-02 PROCEDURE — 0DBH8ZX EXCISION OF CECUM, VIA NATURAL OR ARTIFICIAL OPENING ENDOSCOPIC, DIAGNOSTIC: ICD-10-PCS | Performed by: INTERNAL MEDICINE

## 2024-07-02 DEVICE — REPLAY HEMOSTASIS CLIP, 11MM SPAN
Type: IMPLANTABLE DEVICE | Status: FUNCTIONAL
Brand: REPLAY

## 2024-07-02 RX ORDER — LIDOCAINE HYDROCHLORIDE 10 MG/ML
INJECTION, SOLUTION EPIDURAL; INFILTRATION; INTRACAUDAL; PERINEURAL AS NEEDED
Status: DISCONTINUED | OUTPATIENT
Start: 2024-07-02 | End: 2024-07-02 | Stop reason: SURG

## 2024-07-02 RX ORDER — DEXTROSE MONOHYDRATE 25 G/50ML
50 INJECTION, SOLUTION INTRAVENOUS
Status: DISCONTINUED | OUTPATIENT
Start: 2024-07-02 | End: 2024-07-02

## 2024-07-02 RX ORDER — NICOTINE POLACRILEX 4 MG
15 LOZENGE BUCCAL
Status: DISCONTINUED | OUTPATIENT
Start: 2024-07-02 | End: 2024-07-02

## 2024-07-02 RX ORDER — NALOXONE HYDROCHLORIDE 0.4 MG/ML
0.08 INJECTION, SOLUTION INTRAMUSCULAR; INTRAVENOUS; SUBCUTANEOUS ONCE AS NEEDED
Status: DISCONTINUED | OUTPATIENT
Start: 2024-07-02 | End: 2024-07-02

## 2024-07-02 RX ORDER — SODIUM CHLORIDE, SODIUM LACTATE, POTASSIUM CHLORIDE, CALCIUM CHLORIDE 600; 310; 30; 20 MG/100ML; MG/100ML; MG/100ML; MG/100ML
INJECTION, SOLUTION INTRAVENOUS CONTINUOUS
Status: DISCONTINUED | OUTPATIENT
Start: 2024-07-02 | End: 2024-07-02

## 2024-07-02 RX ORDER — NICOTINE POLACRILEX 4 MG
30 LOZENGE BUCCAL
Status: DISCONTINUED | OUTPATIENT
Start: 2024-07-02 | End: 2024-07-02

## 2024-07-02 RX ADMIN — SODIUM CHLORIDE, SODIUM LACTATE, POTASSIUM CHLORIDE, CALCIUM CHLORIDE: 600; 310; 30; 20 INJECTION, SOLUTION INTRAVENOUS at 11:27:00

## 2024-07-02 RX ADMIN — SODIUM CHLORIDE, SODIUM LACTATE, POTASSIUM CHLORIDE, CALCIUM CHLORIDE: 600; 310; 30; 20 INJECTION, SOLUTION INTRAVENOUS at 10:58:00

## 2024-07-02 RX ADMIN — LIDOCAINE HYDROCHLORIDE 50 MG: 10 INJECTION, SOLUTION EPIDURAL; INFILTRATION; INTRACAUDAL; PERINEURAL at 11:01:00

## 2024-07-02 NOTE — H&P
[facility]   part of [facility]    History & Physical    Cirilo Reyes Patient Status:  Hospital Outpatient Surgery    3/7/1965 MRN [id number]   Location [facility] Attending Cheng Montes MD   Hosp Day # 0 PCP Wil Wahl MD     Date:  2024  Date of Admission:  2024    History provided by:patient  Chief Complaint:   colon polyps      HPI:   Cirilo Reyes is a(n) 59 year old male. Here for colon polyps    History     Past Medical History:    Anesthesia complication    DIFFICULTY WAKING UP AFTER COLONOSCOPY    Diabetes (HCC)    Esophageal reflux    Hearing impairment    [place]    Neuropathy    Problems with swallowing    Visual impairment     Past Surgical History:   Procedure Laterality Date    Carpal tunnel release Right     Colonoscopy      Colonoscopy N/A 2024    Procedure: COLONOSCOPY;  Surgeon: Gabriele Luis MD;  Location:  ENDOSCOPY    Fracture surgery Right     wrist    Other accessory Left     leg    Other surgical history      jaw    Other surgical history Left     elbow    Other surgical history Left     leg    Upper gi endoscopy,exam       History reviewed. No pertinent family history.  Social History:  Social History     Socioeconomic History    Marital status:    Tobacco Use    Smoking status: Some Days     Current packs/day: 1.00     Types: Cigarettes    Smokeless tobacco: Never   Vaping Use    Vaping status: Never Used   Substance and Sexual Activity    Alcohol use: Not Currently     Alcohol/week: 2.0 standard drinks of alcohol     Types: 2 Cans of beer per week    Drug use: Never     Social Determinants of Health     Food Insecurity: No Food Insecurity (5/15/2024)    Food Insecurity     Food Insecurity: Never true   Transportation Needs: Unmet Transportation Needs (5/15/2024)    Transportation Needs     Lack of Transportation: Yes   Housing Stability: Low Risk  (5/15/2024)    Housing Stability     Housing Instability: No      Allergies/Medications:   Allergies: No Known Allergies  Medications Prior to Admission   Medication Sig    nortriptyline 10 MG Oral Cap Take 1 capsule (10 mg total) by mouth nightly.    insulin glargine (LANTUS SOLOSTAR) 100 UNIT/ML Subcutaneous Solution Pen-injector Inject 16 Units into the skin nightly.    metFORMIN HCl ER, OSM, 500 MG (OSM) Oral Tablet 24 Hr Take 1 tablet (500 mg total) by mouth 2 (two) times daily with meals.    escitalopram 10 MG Oral Tab Take 1 tablet (10 mg total) by mouth daily.    pantoprazole 40 MG Oral Tab EC Take 1 tablet (40 mg total) by mouth every morning before breakfast.       Review of Systems:   Pertinent items are noted in HPI.  A comprehensive review of systems was negative.    Physical Exam:   Vital Signs:  Blood pressure (!) 104/93, pulse 77, temperature 97.3 °F (36.3 °C), temperature source Temporal, resp. rate 18, height 6' (1.829 m), weight 145 lb (65.8 kg), SpO2 99%.     General appearance:  alert, appears stated age, and cooperative  Head: Normocephalic, without obvious abnormality, atraumatic  Pulmonary: clear to auscultation bilaterally  Cardiovascular: S1, S2 normal, no murmur, click, rub or gallop, regular rate and rhythm  Abdominal: soft, non-tender; bowel sounds normal; no masses,  no organomegaly  Extremities: extremities normal, atraumatic, no cyanosis or edema        Results:     Lab Results   Component Value Date    WBC 2.9 (L) 05/19/2024    HGB 12.5 (L) 05/19/2024    HCT 37.1 (L) 05/19/2024    PLT 67.0 (L) 05/19/2024    CREATSERUM 0.30 (L) 05/19/2024    BUN 2 (L) 05/19/2024     05/19/2024    K 4.7 05/19/2024     05/19/2024    CO2 27.0 05/19/2024     (H) 05/19/2024    CA 8.7 05/19/2024    ALB 2.5 (L) 05/19/2024    ALKPHO 180 (H) 05/19/2024    BILT 2.0 05/19/2024    TP 5.7 (L) 05/19/2024     (H) 05/19/2024    ALT 56 05/19/2024    LIP 60 05/14/2024    MG 1.5 (L) 05/14/2024    B12 1,823 (H) 05/18/2024       No results found.         Assessment/Plan:    colonoscopy      Cheng Montes MD  7/2/2024

## 2024-07-02 NOTE — OPERATIVE REPORT
Colonoscopy Operative Report    Cirilo Reyes Patient Status:  [facility] Outpatient Surgery    3/7/1965 MRN [id number]   [facility] Attending Cheng Montes MD   Hosp Day #   0 PCP Wil Wahl MD     Pre-Operative Diagnosis: colon polyps    Post-Operative Diagnosis:  One 3 mm cecal and one 3 mm ascending polyp resected with cold snare  Internal hemorrhoids  One flat 6 mm cecal polyp injected with 3 ml ascendo, resected with hot snare and one hemoclip placed    Procedure Performed: Procedure(s):  COLONOSCOPY with injection, polypectomy, clip placement    Informed Consent: Informed consent for both the procedure and sedation were obtained from the patient. The potentially life-threatening complications of sedation, bleeding,  perforation, transfusion or repeat endoscopy  were reviewed along with the possible need for hospitalization, surgical management, transfusion or repeat endoscopy should one of these complications arise. The patient understands and is agreeable to proceed.  Sedation Type: MAC-Patient received sedation with monitored anesthesia provided by an anesthesiologist    Cecum Withdrawal Time:  15 minutes  Date of previous colonoscopy: -poor prep    Procedure Description: The patient was placed in the left lateral decubitus position.  After careful digital rectal examination, the Pediatric colonoscope was inserted into the rectum and advanced to the level of the cecum under direct visualization. The cecum was identified by landmarks, including the appendiceal orifice and ileoceccal valve. Careful examination of the entire colon was performed during withdrawal of the endoscope. The scope was withdrawn to the rectum and retroflexion was performed.  The patient tolerated the procedure well with no immediate complications. The patient was transferred to the recovery area in stable condition.  Quality of Preparation:  Adequate  Aronchick Bowel Prep Scale:  good  Findings:   One 3 mm cecal and one 3 mm ascending polyp resected with cold snare  Internal hemorrhoids  One flat 6 mm cecal polyp injected with 3 ml ascendo, resected with hot snare and one hemoclip placed  Recommendations: no nsaids for 7 days, colonoscopy in 3 years, await pathology  Discharge:  The patient was given an after visit summary detailing the procedure, findings, recommendations and follow up plans.     Cheng Montes MD  7/2/2024  11:02 AM

## 2024-07-02 NOTE — ANESTHESIA PREPROCEDURE EVALUATION
PRE-OP EVALUATION    Patient Name: Cirilo Reyes    Admit Diagnosis: COLON CANCER SCREENING    Pre-op Diagnosis: COLON CANCER SCREENING    COLONOSCOPY    Anesthesia Procedure: COLONOSCOPY    Surgeons and Role:     * Cheng Montes MD - Primary    Pre-op vitals reviewed.  Temp: 97.3 °F (36.3 °C)  Pulse: 77  Resp: 18  BP: 104/93  SpO2: 99 %  Body mass index is 19.67 kg/m².    Current medications reviewed.  Hospital Medications:   glucose (Dex4) 15 GM/59ML oral liquid 15 g  15 g Oral Q15 Min PRN    Or    glucose (Glutose) 40% oral gel 15 g  15 g Oral Q15 Min PRN    Or    glucose-vitamin C (Dex-4) chewable tab 4 tablet  4 tablet Oral Q15 Min PRN    Or    dextrose 50% injection 50 mL  50 mL Intravenous Q15 Min PRN    Or    glucose (Dex4) 15 GM/59ML oral liquid 30 g  30 g Oral Q15 Min PRN    Or    glucose (Glutose) 40% oral gel 30 g  30 g Oral Q15 Min PRN    Or    glucose-vitamin C (Dex-4) chewable tab 8 tablet  8 tablet Oral Q15 Min PRN    lactated ringers infusion   Intravenous Continuous       Outpatient Medications:     Medications Prior to Admission   Medication Sig Dispense Refill Last Dose    nortriptyline 10 MG Oral Cap Take 1 capsule (10 mg total) by mouth nightly. 30 capsule 0 7/1/2024    insulin glargine (LANTUS SOLOSTAR) 100 UNIT/ML Subcutaneous Solution Pen-injector Inject 16 Units into the skin nightly.   7/1/2024    metFORMIN HCl ER, OSM, 500 MG (OSM) Oral Tablet 24 Hr Take 1 tablet (500 mg total) by mouth 2 (two) times daily with meals.   7/1/2024    escitalopram 10 MG Oral Tab Take 1 tablet (10 mg total) by mouth daily.   7/1/2024    pantoprazole 40 MG Oral Tab EC Take 1 tablet (40 mg total) by mouth every morning before breakfast.   7/1/2024       Allergies: Patient has no known allergies.      Anesthesia Evaluation    Patient summary reviewed.    Anesthetic Complications  (-) history of anesthetic complications         GI/Hepatic/Renal  Comment: Colon cancer screening    (+) GERD                            Cardiovascular    Negative cardiovascular ROS.                                                   Endo/Other      (+) diabetes  type 2, not using insulin                         Pulmonary  Comment: Some day smoker  Negative pulmonary ROS.                       Neuro/Psych                                      Past Surgical History:   Procedure Laterality Date    Carpal tunnel release Right     Colonoscopy      Colonoscopy N/A 5/16/2024    Procedure: COLONOSCOPY;  Surgeon: Gabriele Luis MD;  Location:  ENDOSCOPY    Fracture surgery Right     wrist    Other accessory Left     leg    Other surgical history      jaw    Other surgical history Left     elbow    Other surgical history Left     leg    Upper gi endoscopy,exam       Social History     Socioeconomic History    Marital status:    Tobacco Use    Smoking status: Some Days     Current packs/day: 1.00     Types: Cigarettes    Smokeless tobacco: Never   Vaping Use    Vaping status: Never Used   Substance and Sexual Activity    Alcohol use: Not Currently     Alcohol/week: 2.0 standard drinks of alcohol     Types: 2 Cans of beer per week    Drug use: Never     History   Drug Use Unknown     Available pre-op labs reviewed.  Lab Results   Component Value Date    WBC 2.9 (L) 05/19/2024    RBC 3.71 (L) 05/19/2024    HGB 12.5 (L) 05/19/2024    HCT 37.1 (L) 05/19/2024    .0 05/19/2024    MCH 33.7 05/19/2024    MCHC 33.7 05/19/2024    RDW 12.6 05/19/2024    PLT 67.0 (L) 05/19/2024     Lab Results   Component Value Date     05/19/2024    K 4.7 05/19/2024     05/19/2024    CO2 27.0 05/19/2024    BUN 2 (L) 05/19/2024    CREATSERUM 0.30 (L) 05/19/2024     (H) 05/19/2024    CA 8.7 05/19/2024            Airway      Mallampati: II  Mouth opening: >3 FB  TM distance: > 6 cm  Neck ROM: full Cardiovascular    Cardiovascular exam normal.  Rhythm: regular  Rate: normal  (-) murmur   Dental      Dental appliance(s): lower dentures and upper  dentures       Pulmonary    Pulmonary exam normal.  Breath sounds clear to auscultation bilaterally.               Other findings              ASA: 2   Plan: MAC  NPO status verified and patient meets guidelines.        Comment: Plan for MAC. A detailed discussion of the risks and benefits of the proposed anesthetic was had in the preoperative area, including risk of conversion to a general anesthetic, nausea/vomiting, dental damage, sore throat and allergic/ adverse reactions to medications administered. The possibility of needing to convert to general anesthesia if necessary was discussed. Questions answered and patient wishes to proceed. Consent signed.     Plan/risks discussed with: patient                Present on Admission:  **None**

## 2024-07-02 NOTE — ANESTHESIA POSTPROCEDURE EVALUATION
[facility]    Cirilo Reyes Patient Status:  Hospital Outpatient Surgery   Age/Gender 59 year old male MRN [id number]   Location [facility] ENDOSCOPY PAIN CENTER Attending Cheng Mnotes MD   Hosp Day # 0 PCP Wil Wahl MD       Anesthesia Post-op Note    COLONOSCOPY WITH ASCENDO LIFT INJECTION AND HOT AND COLD SNARE POLYPECTOMIES    Procedure Summary       Date: 07/02/24 Room / Location:  ENDOSCOPY 03 / [facility] END[facility]COPY    Anesthesia Start: 1059 Anesthesia Stop: 1127    Procedure: COLONOSCOPY WITH ASCENDO LIFT INJECTION AND HOT AND COLD SNARE POLYPECTOMIES Diagnosis: (COLON POLYPS, INTERNAL HEMORRHOIDS)    Surgeons: Cheng Montes MD Anesthesiologist: Adal Chacko MD    Anesthesia Type: MAC ASA Status: 2            Anesthesia Type: MAC    Vitals Value Taken Time   BP 99/82 07/02/24 1128   Temp 98 07/02/24 1128   Pulse 79 07/02/24 1128   Resp 18 07/02/24 1128   SpO2 100 % 07/02/24 1128   Vitals shown include unfiled device data.    Patient Location: Endoscopy    Anesthesia Type: MAC    Airway Patency: patent    Postop Pain Control: adequate    Mental Status: mildly sedated but able to meaningfully participate in the post-anesthesia evaluation    Nausea/Vomiting: none    Cardiopulmonary/Hydration status: stable euvolemic    Complications: no apparent anesthesia related complications    Postop vital signs: stable    Dental Exam: Unchanged from Preop    Patient to be discharged home when criteria met.

## 2024-07-02 NOTE — DISCHARGE INSTRUCTIONS
ENDOSCOPY DISCHARGE INSTRUCTIONS    Procedure Performed:   Colonoscopy    Endoscopist: No name on file  FINDINGS:   3 Colon polyps (a growth in the colon)    MEDICATIONS:  Avoid NSAID such as Asprin, ibuprofen, naproxen, Celebrex, anacin, Motrin or Exedrin for 7 days and You may resume all other medications today    DIET:  Resume Normal Diet    BIOPSIES:  Biopsy results will be released to you as soon as they are available as is now the law. This will not allow your physician the opportunity to go over these before they are released to you. It is not necessary to contact the office for explanation of these results. Your physician will contact you within a few business days of their release to review the findings with you    X-RAYS/LABS:   No X-rays/Labs were ordered today    ADDITIONAL RECOMMENDATIONS:        Activity for remainder of today:    REST TODAY  DO NOT drive or operate heavy machinery  DO NOT drink any alcoholic beverages  DO NOT sign any legal documents or make any important decisions    After your procedure(s):  It is not unusual to feel bloated or gassy .  Passing gas and belching is encouraged. Lying on your left side with your knees flexed may relieve the discomfort. A hot pack to the abdomen may also help.    After your gastroscopy (upper endoscopy): You may experience a slight sore throat which will subside. Throat lozenges or salt water gargle can be used.    FOLLOW-UP:  Contact the office at [phone] for follow-up appointment is needed or if you develop any of the following:    Severe abdominal pain/discomfort     Excessive bleeding                     Black tarry stool    Difficulty breathing/swallowing      Persistent nausea/vomiting  Fever above 100 degrees or chills

## (undated) DEVICE — GIJAW SINGLE-USE BIOPSY FORCEPS WITH NEEDLE: Brand: GIJAW

## (undated) DEVICE — BALLOON HEMOSTATIC EUS LINEAR

## (undated) DEVICE — NEEDLE INJ 25GA CATH 230CM CHN 2.8MM ACUJECT

## (undated) DEVICE — LASSO POLYPECTOMY SNARE: Brand: LASSO

## (undated) DEVICE — 3M™ RED DOT™ MONITORING ELECTRODE WITH FOAM TAPE AND STICKY GEL, 50/BAG, 20/CASE, 72/PLT 2570: Brand: RED DOT™

## (undated) DEVICE — KIT VLV 5 PC AIR H2O SUCT BX ENDOGATOR CONN

## (undated) DEVICE — KIT CUSTOM ENDOPROCEDURE STERIS

## (undated) DEVICE — ECHOTIP ULTRA, ENDOSCOPIC ULTRASOUND NEEDLE: Brand: ECHOTIP

## (undated) DEVICE — HERCULES 3 STAGE BALLOON ESOPHAGEAL: Brand: HERCULES

## (undated) DEVICE — 10FT COMBINED O2 DELIVERY/CO2 MONITORING. FILTER WITH MICROSTREAM TYPE LUER: Brand: DUAL ADULT NASAL CANNULA

## (undated) DEVICE — REM POLYHESIVE ADULT PATIENT RETURN ELECTRODE: Brand: VALLEYLAB

## (undated) DEVICE — GLOVE,SURG,SENSICARE,ALOE,LF,PF,7: Brand: MEDLINE

## (undated) DEVICE — NET SPEC 3X6CM TIP DIA2.5MM CATH L230CM

## (undated) DEVICE — TRAP POLYP W/ 2 SPEC TY CLR MAGNIFYING WIND

## (undated) DEVICE — BITEBLOCK ENDOSCP 60FR MAXI STRP

## (undated) DEVICE — 1200CC GUARDIAN II: Brand: GUARDIAN

## (undated) DEVICE — ASCENDO SUBMUCOSAL LIFTING AGNT 5ML

## (undated) DEVICE — DEVICE INFL 60ML 15ATM PRSS COOK SPHR

## (undated) NOTE — LETTER
[address]  [address]  Authorization for Surgical Operation and Procedure     Date:___________                                                                                                         Time:__________  I hereby authorize Surgeon(s):  Gabriele Luis MD, my physician and his/her assistants (if applicable), which may include medical students, residents, and/or fellows, to perform the following surgical operation/ procedure and administer such anesthesia as may be determined necessary by my physician:  Operation/Procedure name (s) Procedure(s):  ESOPHAGOGASTRODUODENOSCOPY (EGD), COLONOSCOPY  COLONOSCOPY on Cirilo Reyes   2.   I recognize that during the surgical operation/procedure, unforeseen conditions may necessitate additional or different procedures than those listed above.  I, therefore, further authorize and request that the above-named surgeon, assistants, or designees perform such procedures as are, in their judgment, necessary and desirable.    3.   My surgeon/physician has discussed prior to my surgery the potential benefits, risks and side effects of this procedure; the likelihood of achieving goals; and potential problems that might occur during recuperation.  They also discussed reasonable alternatives to the procedure, including risks, benefits, and side effects related to the alternatives and risks related to not receiving this procedure.  I have had all my questions answered and I acknowledge that no guarantee has been made as to the result that may be obtained.    4.   Should the need arise during my operation/procedure, which includes change of level of care prior to discharge, I also consent to the administration of blood and/or blood products.  Further, I understand that despite careful testing and screening of blood or blood products by collecting agencies, I may still be subject to ill effects as a result of receiving a blood transfusion  and/or blood products.  The following are some, but not all, of the potential risks that can occur: fever and allergic reactions, hemolytic reactions, transmission of diseases such as Hepatitis, AIDS and Cytomegalovirus (CMV) and fluid overload.  In the event that I wish to have an autologous transfusion of my own blood, or a directed donor transfusion, I will discuss this with my physician.  Check only if Refusing Blood or Blood Products  I understand refusal of blood or blood products as deemed necessary by my physician may have serious consequences to my condition to include possible death. I hereby assume responsibility for my refusal and release the hospital, its personnel, and my physicians from any responsibility for the consequences of my refusal.          o  Refuse      5.   I authorize the use of any specimen, organs, tissues, body parts or foreign objects that may be removed from my body during the operation/procedure for diagnosis, research or teaching purposes and their subsequent disposal by hospital authorities.  I also authorize the release of specimen test results and/or written reports to my treating physician on the hospital medical staff or other referring or consulting physicians involved in my care, at the discretion of the Pathologist or my treating physician.    6.   I consent to the photographing or videotaping of the operations or procedures to be performed, including appropriate portions of my body for medical, scientific, or educational purposes, provided my identity is not revealed by the pictures or by descriptive texts accompanying them.  If the procedure has been photographed/videotaped, the surgeon will obtain the original picture, image, videotape or CD.  The hospital will not be responsible for storage, release or maintenance of the picture, image, tape or CD.    7.   I consent to the presence of a  or observers in the operating room as deemed necessary by my  physician or their designees.    8.   I recognize that in the event my procedure results in extended X-Ray/fluoroscopy time, I may develop a skin reaction.    9. If I have a Do Not Attempt Resuscitation (DNAR) order in place, that status will be suspended while in the operating room, procedural suite, and during the recovery period unless otherwise explicitly stated by me (or a person authorized to consent on my behalf). The surgeon or my attending physician will determine when the applicable recovery period ends for purposes of reinstating the DNAR order.  10. Patients having a sterilization procedure: I understand that if the procedure is successful the results will be permanent and it will therefore be impossible for me to inseminate, conceive, or bear children.  I also understand that the procedure is intended to result in sterility, although the result has not been guaranteed.   11. I acknowledge that my physician has explained sedation/analgesia administration to me including the risk and benefits I consent to the administration of sedation/analgesia as may be necessary or desirable in the judgment of my physician.    I CERTIFY THAT I HAVE READ AND FULLY UNDERSTAND THE ABOVE CONSENT TO OPERATION and/or OTHER PROCEDURE.    _________________________________________  __________________________________  Signature of Patient     Signature of Responsible Person         ___________________________________         Printed Name of Responsible Person           _________________________________                 Relationship to Patient  _________________________________________  ______________________________  Signature of Witness          Date  Time      Patient Name: Cirilo Reyes     : 3/7/1965                 Printed: May 16, 2024     Medical Record #: [id number]                     Page [address] of [address] [place] [address] [place]  [address]    Consent for  Anesthesia    I, Cirilo Reyes agree to be cared for by an anesthesiologist, who is specially trained to monitor me and give me medicine to put me to sleep or keep me comfortable during my procedure    I understand that my anesthesiologist is not an employee or agent of [facility] [employer]. He or she works for [employer].    As the patient asking for anesthesia services, I agree to:  Allow the anesthesiologist (anesthesia doctor) to give me medicine and do additional procedures as necessary. Some examples are: Starting or using an “IV” to give me medicine, fluids or blood during my procedure, and having a breathing tube placed to help me breathe when I’m asleep (intubation). In the event that my heart stops working properly, I understand that my anesthesiologist will make every effort to sustain my life, unless otherwise directed by [facility] Do Not Resuscitate documents.  Tell my anesthesia doctor before my procedure:  If I am pregnant.  The last time that I ate or drank.  All of the medicines I take (including prescriptions, herbal supplements, and pills I can buy without a prescription (including street drugs/illegal medications). Failure to inform my anesthesiologist about these medicines may increase my risk of anesthetic complications.  If I am allergic to anything or have had a reaction to anesthesia before.  I understand how the anesthesia medicine will help me (benefits).  I understand that with any type of anesthesia medicine there are risks:  The most common risks are: nausea, vomiting, sore throat, muscle soreness, damage to my eyes, mouth, or teeth (from breathing tube placement).  Rare risks include: remembering what happened during my procedure, allergic reactions to medications, injury to my airway, heart, lungs, vision, nerves, or muscles and in extremely rare instances death.  My doctor has explained to me other choices available to me for my care  (alternatives).  Pregnant Patients (“epidural”):  I understand that the risks of having an epidural (medicine given into my back to help control pain during labor), include itching, low blood pressure, difficulty urinating, headache or slowing of the baby’s heart. Very rare risks include infection, bleeding, seizure, irregular heart rhythms and nerve injury.  Regional Anesthesia (“spinal”, “epidural”, & “nerve blocks”):  I understand that rare but potential complications include headache, bleeding, infection, seizure, irregular heart rhythms, and nerve injury.    I can change my mind about having anesthesia services at any time before I get the medicine.    _____________________________________________________________________________  Patient (or Representative) Signature/Relationship to Patient  Date   Time    _____________________________________________________________________________   Name (if used)    Language/Organization   Time    _____________________________________________________________________________  Anesthesiologist Signature     Date   Time  I have discussed the procedure and information above with the patient (or patient’s representative) and answered their questions. The patient or their representative has agreed to have anesthesia services.    _____________________________________________________________________________  Witness        Date   Time  I have verified that the signature is that of the patient or patient’s representative, and that it was signed before the procedure  Patient Name: Cirilo Reyes     : 3/7/1965                 Printed: May 16, 2024     Medical Record #: [id number]                     Page 2 of 2

## (undated) NOTE — LETTER
[address]  [address]  Authorization for Surgical Operation and Procedure     Date:___________                                                                                                         Time:__________  I hereby authorize Surgeon(s):  Gabriele Luis MD, my physician and his/her assistants (if applicable), which may include medical students, residents, and/or fellows, to perform the following surgical operation/ procedure and administer such anesthesia as may be determined necessary by my physician:  Operation/Procedure name (s) Procedure(s):  ESOPHAGOGASTRODUODENOSCOPY (EGD), COLONOSCOPY  COLONOSCOPY on Cirilo Reyes   2.   I recognize that during the surgical operation/procedure, unforeseen conditions may necessitate additional or different procedures than those listed above.  I, therefore, further authorize and request that the above-named surgeon, assistants, or designees perform such procedures as are, in their judgment, necessary and desirable.    3.   My surgeon/physician has discussed prior to my surgery the potential benefits, risks and side effects of this procedure; the likelihood of achieving goals; and potential problems that might occur during recuperation.  They also discussed reasonable alternatives to the procedure, including risks, benefits, and side effects related to the alternatives and risks related to not receiving this procedure.  I have had all my questions answered and I acknowledge that no guarantee has been made as to the result that may be obtained.    4.   Should the need arise during my operation/procedure, which includes change of level of care prior to discharge, I also consent to the administration of blood and/or blood products.  Further, I understand that despite careful testing and screening of blood or blood products by collecting agencies, I may still be subject to ill effects as a result of receiving a blood transfusion  and/or blood products.  The following are some, but not all, of the potential risks that can occur: fever and allergic reactions, hemolytic reactions, transmission of diseases such as Hepatitis, AIDS and Cytomegalovirus (CMV) and fluid overload.  In the event that I wish to have an autologous transfusion of my own blood, or a directed donor transfusion, I will discuss this with my physician.  Check only if Refusing Blood or Blood Products  I understand refusal of blood or blood products as deemed necessary by my physician may have serious consequences to my condition to include possible death. I hereby assume responsibility for my refusal and release the hospital, its personnel, and my physicians from any responsibility for the consequences of my refusal.          o  Refuse      5.   I authorize the use of any specimen, organs, tissues, body parts or foreign objects that may be removed from my body during the operation/procedure for diagnosis, research or teaching purposes and their subsequent disposal by hospital authorities.  I also authorize the release of specimen test results and/or written reports to my treating physician on the hospital medical staff or other referring or consulting physicians involved in my care, at the discretion of the Pathologist or my treating physician.    6.   I consent to the photographing or videotaping of the operations or procedures to be performed, including appropriate portions of my body for medical, scientific, or educational purposes, provided my identity is not revealed by the pictures or by descriptive texts accompanying them.  If the procedure has been photographed/videotaped, the surgeon will obtain the original picture, image, videotape or CD.  The hospital will not be responsible for storage, release or maintenance of the picture, image, tape or CD.    7.   I consent to the presence of a  or observers in the operating room as deemed necessary by my  physician or their designees.    8.   I recognize that in the event my procedure results in extended X-Ray/fluoroscopy time, I may develop a skin reaction.    9. If I have a Do Not Attempt Resuscitation (DNAR) order in place, that status will be suspended while in the operating room, procedural suite, and during the recovery period unless otherwise explicitly stated by me (or a person authorized to consent on my behalf). The surgeon or my attending physician will determine when the applicable recovery period ends for purposes of reinstating the DNAR order.  10. Patients having a sterilization procedure: I understand that if the procedure is successful the results will be permanent and it will therefore be impossible for me to inseminate, conceive, or bear children.  I also understand that the procedure is intended to result in sterility, although the result has not been guaranteed.   11. I acknowledge that my physician has explained sedation/analgesia administration to me including the risk and benefits I consent to the administration of sedation/analgesia as may be necessary or desirable in the judgment of my physician.    I CERTIFY THAT I HAVE READ AND FULLY UNDERSTAND THE ABOVE CONSENT TO OPERATION and/or OTHER PROCEDURE.    _________________________________________  __________________________________  Signature of Patient     Signature of Responsible Person         ___________________________________         Printed Name of Responsible Person           _________________________________                 Relationship to Patient  _________________________________________  ______________________________  Signature of Witness          Date  Time      Patient Name: Cirilo Reyes     : 3/7/1965                 Printed: May 15, 2024     Medical Record #: [id number]                     Page 1 of [address] [place] [address] [place]  [address]    Consent for  Anesthesia    I, Cirilo Reyes agree to be cared for by an anesthesiologist, who is specially trained to monitor me and give me medicine to put me to sleep or keep me comfortable during my procedure    I understand that my anesthesiologist is not an employee or agent of [facility] [employer]. He or she works for [employer].    As the patient asking for anesthesia services, I agree to:  Allow the anesthesiologist (anesthesia doctor) to give me medicine and do additional procedures as necessary. Some examples are: Starting or using an “IV” to give me medicine, fluids or blood during my procedure, and having a breathing tube placed to help me breathe when I’m asleep (intubation). In the event that my heart stops working properly, I understand that my anesthesiologist will make every effort to sustain my life, unless otherwise directed by [facility] Do Not Resuscitate documents.  Tell my anesthesia doctor before my procedure:  If I am pregnant.  The last time that I ate or drank.  All of the medicines I take (including prescriptions, herbal supplements, and pills I can buy without a prescription (including street drugs/illegal medications). Failure to inform my anesthesiologist about these medicines may increase my risk of anesthetic complications.  If I am allergic to anything or have had a reaction to anesthesia before.  I understand how the anesthesia medicine will help me (benefits).  I understand that with any type of anesthesia medicine there are risks:  The most common risks are: nausea, vomiting, sore throat, muscle soreness, damage to my eyes, mouth, or teeth (from breathing tube placement).  Rare risks include: remembering what happened during my procedure, allergic reactions to medications, injury to my airway, heart, lungs, vision, nerves, or muscles and in extremely rare instances death.  My doctor has explained to me other choices available to me for my care  (alternatives).  Pregnant Patients (“epidural”):  I understand that the risks of having an epidural (medicine given into my back to help control pain during labor), include itching, low blood pressure, difficulty urinating, headache or slowing of the baby’s heart. Very rare risks include infection, bleeding, seizure, irregular heart rhythms and nerve injury.  Regional Anesthesia (“spinal”, “epidural”, & “nerve blocks”):  I understand that rare but potential complications include headache, bleeding, infection, seizure, irregular heart rhythms, and nerve injury.    I can change my mind about having anesthesia services at any time before I get the medicine.    _____________________________________________________________________________  Patient (or Representative) Signature/Relationship to Patient  Date   Time    _____________________________________________________________________________   Name (if used)    Language/Organization   Time    _____________________________________________________________________________  Anesthesiologist Signature     Date   Time  I have discussed the procedure and information above with the patient (or patient’s representative) and answered their questions. The patient or their representative has agreed to have anesthesia services.    _____________________________________________________________________________  Witness        Date   Time  I have verified that the signature is that of the patient or patient’s representative, and that it was signed before the procedure  Patient Name: Cirilo Reyes     : 3/7/1965                 Printed: May 15, 2024     Medical Record #: [id number]                     Page 2 of 2

## (undated) NOTE — LETTER
May 19, 2024      Patient: Cirilo Reyes   YOB: 1965   Date of Visit: 5/14/2024         To Whom It May Concern:  This certifies that Cirilo Reyes was admitted for medical care from 5/14/24 to 5/19/24. Please excuse him from work for these dates. Do not hesitate to call with any questions or concerns.      Sincerely,      N Joshua Betts MD  [facility]   [phone]